# Patient Record
Sex: FEMALE | Race: ASIAN | Employment: OTHER | URBAN - METROPOLITAN AREA
[De-identification: names, ages, dates, MRNs, and addresses within clinical notes are randomized per-mention and may not be internally consistent; named-entity substitution may affect disease eponyms.]

---

## 2017-02-22 PROBLEM — Z98.890 HISTORY OF PARATHYROID SURGERY: Status: ACTIVE | Noted: 2017-02-22

## 2017-02-28 ENCOUNTER — HOSPITAL ENCOUNTER (OUTPATIENT)
Dept: PHYSICAL THERAPY | Age: 72
Discharge: HOME | End: 2017-02-28
Attending: INTERNAL MEDICINE
Payer: MEDICARE

## 2017-02-28 DIAGNOSIS — M75.01 ADHESIVE CAPSULITIS OF RIGHT SHOULDER: ICD-10-CM

## 2017-02-28 PROCEDURE — 97162 PT EVAL MOD COMPLEX 30 MIN: CPT

## 2017-02-28 PROCEDURE — G8985 CARRY GOAL STATUS: HCPCS

## 2017-02-28 PROCEDURE — G8984 CARRY CURRENT STATUS: HCPCS

## 2017-02-28 NOTE — PROGRESS NOTES
Nathalie Gibbons  : [facility] [address]  [phone]   [phone]        OUTPATIENT PHYSICAL THERAPY:Initial Assessment 2017    ICD-10: Treatment Diagnosis: Pain in Joint, R Shoulder- M25.511  Pain in Joint, L Shoulder- M25.512  Precautions/Allergies:   Review of patient's allergies indicates no known allergies. Fall Risk Score: 0 (? 5 = High Risk)  MD Orders: Eval and Treat MEDICAL/REFERRING DIAGNOSIS:  Adhesive capsulitis of right shoulder [M75.01]  DATE OF ONSET: Chronic for the past year  REFERRING PHYSICIAN: Toi Mejias MD  RETURN PHYSICIAN APPOINTMENT: 3/28/17     INITIAL ASSESSMENT:  Ms. Jena Almaguer presents with signs and symptoms consistent with R and L supraspinatus tendinopathy and muscle guarding/spasms with loss of ROM due to pain and muscle dysfunction. PROBLEM LIST (Impacting functional limitations):  1. Decreased Strength  2. Decreased ADL/Functional Activities  3. Increased Pain  4. Decreased Activity Tolerance  5. Decreased Flexibility/Joint Mobility INTERVENTIONS PLANNED:  1. Home Exercise Program (HEP)  2. Manual Therapy  3. Neuromuscular Re-education/Strengthening  4. Range of Motion (ROM)  5. Therapeutic Activites  6. Therapeutic Exercise/Strengthening   TREATMENT PLAN:  Effective Dates: 17 TO 17. Frequency/Duration: 2 times a week for 8 weeks  GOALS: (Goals have been discussed and agreed upon with patient.)  SHORT-TERM FUNCTIONAL GOALS: Time Frame: 3/14/2017  1. The patient will be independent with a basic home exercise program(HEP) to address pain, AROM, and muscle function. 2. The patient will show a decrease in pain level to <3/10. DISCHARGE GOALS: Time Frame: 2017  1. The patient will be independent with an advanced home exercise program(HEP) to address pain, AROM, and muscle function.   2. The patient will show improvement of AROM to within functional limits to improve ability to reach overhead, reach behind back, and perform self care activities. 3. The patient will show a return of bilateral shoulder girdle muscle strength to >4+/5 and improvements in muscle coordination, control, and conditioning for return to activities of reaching overhead, perform cooking, perform dressing and self care activities, and performing lifting/carrying activities. 4. The patient will show a decrease in pain level to <2/10 to improve ability to return to activities of reaching overhead, perform cooking, perform dressing and self care activities, and performing lifting/carrying activities. 5. The patient will improve QuickDASH score to <19/55 to reflect an improvement in this patients self reported functional ability. Rehabilitation Potential For Stated Goals: Good  Regarding Elmira Multani's therapy, I certify that the treatment plan above will be carried out by a therapist or under their direction. Thank you for this referral,  Jose Villegas , DPT, OCS, FAAOMPT, CSCS     Referring Physician Signature: Toi Mejias MD              Date                    The information in this section was collected on 2/28/17 (except where otherwise noted). HISTORY:   History of Present Injury/Illness (Reason for Referral): The patient comes to physical therapy clinic reporting bilateral shoulder pain with R worse than L. Pain started about a year ago with no known trauma or injury. The patient reports symptoms located subacromial and posterior in both shoulders. [place] of symptoms reported as dull most of the time but sharp with certain movements. Symptoms are reported as getting a little better over the last 3-4 weeks. Average pain level reported as 3/10 and worst pain a 5/10. Aggravating factors include reaching overhead, lifting, carrying, reaching behind back, and reaching up to fix hair.  Relieving factors include massage, heat, ice, tylenol, rest. Imaging performed includes none at this time. Past Medical History/Comorbidities:   Ms. Zachery Howe  has a past medical history of Bronchitis; Cyst in hand (11/26/2013); DJD (degenerative joint disease); History of rectal bleeding (11/26/2013); HTN (hypertension) (11/26/2013); Hyperlipidemia (11/26/2013); Rheumatoid arthritis(714.0) (11/26/2013); URI (upper respiratory infection); and Vitamin D deficiency (11/26/2013). Ms. Zachery Howe  has a past surgical history that includes knee replacement (Bilateral). 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 , Disp: , Rfl:     amLODIPine (NORVASC) 5 mg tablet, TAKE ONE TABLET BY MOUTH ONCE DAILY, Disp: 90 Tab, Rfl: 3    valsartan (DIOVAN) 160 mg tablet, Take 1 Tab by mouth daily. , Disp: 90 Tab, Rfl: 4    Cholecalciferol, Vitamin D3, 50,000 unit cap, Take 1 Cap by mouth every seven (7) days. , Disp: 12 Cap, Rfl: 12    metoprolol tartrate (LOPRESSOR) 100 mg IR tablet, Take 1 Tab by mouth two (2) times a day., Disp: 180 Tab, Rfl: 3    VARICELLA-ZOSTER VIRUS INFECTION PROPHYLAXIS 19,400 unit/0.65 mL susr injection, , Disp: , Rfl:     methotrexate (RHEUMATREX) 2.5 mg tablet, , Disp: , Rfl:     alendronate (FOSAMAX) 70 mg tablet, , Disp: , Rfl:     FOLIC ACID PO, Take  by mouth., Disp: , Rfl:     FLAXSEED OIL, by Does Not Apply route., Disp: , Rfl:     CHOLECALCIFEROL, VITAMIN D3, (VITAMIN D3 PO), Take  by mouth., Disp: , Rfl:    Date Last Reviewed:  2/28/2017    Number of Personal Factors/Comorbidities that affect the Plan of Care: 1-2: MODERATE COMPLEXITY   EXAMINATION:      Observation/Orthostatic:  In Standing    Head Position Forward   Thoracic Observation Slight kyphosis   Scapular Position Elevated R with tense R upper trap   Palpation: tender subacromial space, infraspinatus, and biceps tendon bilaterally    Passive Joint Mobility:  (unable due to muscle guarding from patient)  Joint Right Left   Glenohumeral Joint     Scapulothoracic Joint     Range of Motion:  ROM in degrees AROM Right AROM Left PROM Right PROM Left   Shoulder Flexion 130 pain 125 mild pain  160 mild pain 165 mild pain   Shoulder Abduction 125 pain 135 mild pain 140 mild pain 150 mild pain   Shoulder Extension WFL; mild pain [facility]   Shoulder Internal Rotation (IR)  (reaching up back) Up to L3 Up to L1     Shoulder Internal Rotation (IR)  (45 degrees of abduction)   45 50   Shoulder External Rotation (ER)  (measured in scapular plane)   80 85   ER (measured at 0 degrees of abduction) pain pain     Strength:    Right Left   Shoulder Flexion 4-/5 pain 4-/5 pain   Shoulder Extension 4/5 4/5   Shoulder Abduction 4-/5 pain 4-/5 pain   Shoulder Adduction 4/5 4/5   Shoulder ER 4-/5 pain 4-/5 pain   Shoulder IR 4/5 4/5         Body Structures Involved:  1. Bones  2. Joints  3. Muscles  4. Ligaments Body Functions Affected:  1. Neuromusculoskeletal  2. Movement Related Activities and Participation Affected:  1. General Tasks and Demands  2. Self Care  3. Domestic Life  4. Community, Social and [place]   Number of elements (examined above) that affect the Plan of Care: 4+: HIGH COMPLEXITY   CLINICAL PRESENTATION:   Presentation: Evolving clinical presentation with changing clinical characteristics: MODERATE COMPLEXITY   CLINICAL DECISION MAKING:   Outcome Measure: Tool Used: Disabilities of the Arm, Shoulder and Hand (DASH) Questionnaire - Quick Version  Score:  Initial: 29/55  Most Recent: X/55 (Date: -- )   Interpretation of Score: The DASH is designed to measure the activities of daily living in person's with upper extremity dysfunction or pain.   Each section is scored on a 1-5 scale, 5 representing the greatest disability. The scores of each section are added together for a total score of 55. Score 11 12-19 20-28 29-37 38-45 46-54 55   Modifier CH CI CJ CK CL CM CN     ? Carrying, Moving, and Handling Objects:     - CURRENT STATUS: CK - 40%-59% impaired, limited or restricted    - GOAL STATUS: CI - 1%-19% impaired, limited or restricted    - D/C STATUS:  ---------------To be determined---------------    Medical Necessity:   · Patient demonstrates good rehab potential due to higher previous functional level. Reason for Services/Other Comments:  · Patient continues to require skilled intervention due to continued functional limitations due to continued impairments. Use of outcome tool(s) and clinical judgement create a POC that gives a: Questionable prediction of patient's progress: MODERATE COMPLEXITY            TREATMENT:   (In addition to Assessment/Re-Assessment sessions the following treatments were rendered)  Pre-treatment Symptoms/Complaints:  Dull pain   Pain: Initial:   Pain Intensity 1: 3/10 Post Session:  3/10     Therapeutic Exercise: ( ):  Exercises per grid below to improve mobility, strength and coordination. Required minimal visual and verbal cues to promote proper body posture and promote proper body mechanics. Progressed exercises as indicated. Date:  2/28/17   Activity/Exercise Parameters   None Today                              Manual Therapy (     ): Manual techniques to facilitate improved motion and decreased pain. · None today    Treatment/Session Assessment:    · Response to Treatment:  None today. · Compliance with Program/Exercises: Will assess as treatment progresses. · Recommendations/Intent for next treatment session: \"Next visit will focus on advancements to more challenging activities\".   Total Treatment Duration:  PT Patient Time In/Time Out  Time In: 0930  Time Out: [id number] , DPT, OCS, FAAOMPT, CSCS

## 2017-03-01 NOTE — PROGRESS NOTES
Ambulatory/Rehab Services H2 Model Falls Risk Assessment    Risk Factor Pts. ·   Confusion/Disorientation/Impulsivity  []    4 ·   Symptomatic Depression  []   2 ·   Altered Elimination  []   1 ·   Dizziness/Vertigo  []   1 ·   Gender (Male)  []   1 ·   Any administered antiepileptics (anticonvulsants):  []   2 ·   Any administered benzodiazepines:  []   1 ·   Visual Impairment (specify):  []   1 ·   Portable Oxygen Use  []   1 ·   Orthostatic ? BP  []   1 ·   History of Recent Falls (within 3 mos.)  []   5     Ability to Rise from Chair (choose one) Pts. ·   Ability to rise in a single movement  [x]   0 ·   Pushes up, successful in one attempt  []   1 ·   Multiple attempts, but successful  []   3 ·   Unable to rise without assistance  []   4   Total: (5 or greater = High Risk) 0     Falls Prevention Plan:   []                Physical Limitations to Exercise (specify):   []                Mobility Assistance Device (type):   []                Exercise/Equipment Adaptation (specify):    ©2010 [facility] of [address] Patent #[id number].  Federal Law prohibits the replication, distribution or use without written permission from [facility] [employer]

## 2017-03-03 ENCOUNTER — HOSPITAL ENCOUNTER (OUTPATIENT)
Dept: PHYSICAL THERAPY | Age: 72
Discharge: HOME | End: 2017-03-03
Attending: INTERNAL MEDICINE
Payer: MEDICARE

## 2017-03-03 PROCEDURE — 97110 THERAPEUTIC EXERCISES: CPT

## 2017-03-03 NOTE — PROGRESS NOTES
Krissy Flores  : [facility] [address] [place] [address]  [phone]   [phone]        OUTPATIENT PHYSICAL THERAPY:Daily Note 3/3/2017    ICD-10: Treatment Diagnosis: Pain in Joint, R Shoulder- M25.511  Pain in Joint, L Shoulder- M25.512  Precautions/Allergies:   Review of patient's allergies indicates no known allergies. Fall Risk Score: 0 (? 5 = High Risk)  MD Orders: Eval and Treat MEDICAL/REFERRING DIAGNOSIS:  Persons encountering health services in other specified circumstances [Z76.89]  DATE OF ONSET: Chronic for the past year  REFERRING PHYSICIAN: Aramis Torres MD  RETURN PHYSICIAN APPOINTMENT: 3/28/17     INITIAL ASSESSMENT:  Ms. Cristin Gonzalez presents with signs and symptoms consistent with R and L supraspinatus tendinopathy and muscle guarding/spasms with loss of ROM due to pain and muscle dysfunction. PROBLEM LIST (Impacting functional limitations):  1. Decreased Strength  2. Decreased ADL/Functional Activities  3. Increased Pain  4. Decreased Activity Tolerance  5. Decreased Flexibility/Joint Mobility INTERVENTIONS PLANNED:  1. Home Exercise Program (HEP)  2. Manual Therapy  3. Neuromuscular Re-education/Strengthening  4. Range of Motion (ROM)  5. Therapeutic Activites  6. Therapeutic Exercise/Strengthening   TREATMENT PLAN:  Effective Dates: 17 TO 17. Frequency/Duration: 2 times a week for 8 weeks  GOALS: (Goals have been discussed and agreed upon with patient.)  SHORT-TERM FUNCTIONAL GOALS: Time Frame: 3/14/2017  1. The patient will be independent with a basic home exercise program(HEP) to address pain, AROM, and muscle function. 2. The patient will show a decrease in pain level to <3/10. DISCHARGE GOALS: Time Frame: 2017  1. The patient will be independent with an advanced home exercise program(HEP) to address pain, AROM, and muscle function.   2. The patient will show improvement of AROM to within functional limits to improve ability to reach overhead, reach behind back, and perform self care activities. 3. The patient will show a return of bilateral shoulder girdle muscle strength to >4+/5 and improvements in muscle coordination, control, and conditioning for return to activities of reaching overhead, perform cooking, perform dressing and self care activities, and performing lifting/carrying activities. 4. The patient will show a decrease in pain level to <2/10 to improve ability to return to activities of reaching overhead, perform cooking, perform dressing and self care activities, and performing lifting/carrying activities. 5. The patient will improve QuickDASH score to <19/55 to reflect an improvement in this patients self reported functional ability. Rehabilitation Potential For Stated Goals: Good  Regarding Elmira Multani's therapy, I certify that the treatment plan above will be carried out by a therapist or under their direction. Thank you for this referral,  Miky Reed , DPT, OCS, FAAOMPT, CSCS     Referring Physician Signature: Minnie Koyanagi, MD              Date                    The information in this section was collected on 2/28/17 (except where otherwise noted). HISTORY:   History of Present Injury/Illness (Reason for Referral): The patient comes to physical therapy clinic reporting bilateral shoulder pain with R worse than L. Pain started about a year ago with no known trauma or injury. The patient reports symptoms located subacromial and posterior in both shoulders. [place] of symptoms reported as dull most of the time but sharp with certain movements. Symptoms are reported as getting a little better over the last 3-4 weeks. Average pain level reported as 3/10 and worst pain a 5/10. Aggravating factors include reaching overhead, lifting, carrying, reaching behind back, and reaching up to fix hair.  Relieving factors include massage, heat, ice, tylenol, rest. Imaging performed includes none at this time. Past Medical History/Comorbidities:   Ms. Bibi Mallory  has a past medical history of Bronchitis; Cyst in hand (11/26/2013); DJD (degenerative joint disease); History of rectal bleeding (11/26/2013); HTN (hypertension) (11/26/2013); Hyperlipidemia (11/26/2013); Rheumatoid arthritis(714.0) (11/26/2013); URI (upper respiratory infection); and Vitamin D deficiency (11/26/2013). Ms. Bibi Mallory  has a past surgical history that includes knee replacement (Bilateral). 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 , Disp: , Rfl:     amLODIPine (NORVASC) 5 mg tablet, TAKE ONE TABLET BY MOUTH ONCE DAILY, Disp: 90 Tab, Rfl: 3    valsartan (DIOVAN) 160 mg tablet, Take 1 Tab by mouth daily. , Disp: 90 Tab, Rfl: 4    Cholecalciferol, Vitamin D3, 50,000 unit cap, Take 1 Cap by mouth every seven (7) days. , Disp: 12 Cap, Rfl: 12    metoprolol tartrate (LOPRESSOR) 100 mg IR tablet, Take 1 Tab by mouth two (2) times a day., Disp: 180 Tab, Rfl: 3    VARICELLA-ZOSTER VIRUS INFECTION PROPHYLAXIS 19,400 unit/0.65 mL susr injection, , Disp: , Rfl:     methotrexate (RHEUMATREX) 2.5 mg tablet, , Disp: , Rfl:     alendronate (FOSAMAX) 70 mg tablet, , Disp: , Rfl:     FOLIC ACID PO, Take  by mouth., Disp: , Rfl:     FLAXSEED OIL, by Does Not Apply route., Disp: , Rfl:     CHOLECALCIFEROL, VITAMIN D3, (VITAMIN D3 PO), Take  by mouth., Disp: , Rfl:    Date Last Reviewed:  3/3/2017    Number of Personal Factors/Comorbidities that affect the Plan of Care: 1-2: MODERATE COMPLEXITY   EXAMINATION:      Observation/Orthostatic:  In Standing    Head Position Forward   Thoracic Observation Slight kyphosis   Scapular Position Elevated R with tense R upper trap   Palpation: tender subacromial space, infraspinatus, and biceps tendon bilaterally    Passive Joint Mobility:  (unable due to muscle guarding from patient)  Joint Right Left   Glenohumeral Joint     Scapulothoracic Joint     Range of Motion:  ROM in degrees AROM Right AROM Left PROM Right PROM Left   Shoulder Flexion 130 pain 125 mild pain  160 mild pain 165 mild pain   Shoulder Abduction 125 pain 135 mild pain 140 mild pain 150 mild pain   Shoulder Extension WFL; mild pain [facility]   Shoulder Internal Rotation (IR)  (reaching up back) Up to L3 Up to L1     Shoulder Internal Rotation (IR)  (45 degrees of abduction)   45 50   Shoulder External Rotation (ER)  (measured in scapular plane)   80 85   ER (measured at 0 degrees of abduction) pain pain     Strength:    Right Left   Shoulder Flexion 4-/5 pain 4-/5 pain   Shoulder Extension 4/5 4/5   Shoulder Abduction 4-/5 pain 4-/5 pain   Shoulder Adduction 4/5 4/5   Shoulder ER 4-/5 pain 4-/5 pain   Shoulder IR 4/5 4/5         Body Structures Involved:  1. Bones  2. Joints  3. Muscles  4. Ligaments Body Functions Affected:  1. Neuromusculoskeletal  2. Movement Related Activities and Participation Affected:  1. General Tasks and Demands  2. Self Care  3. Domestic Life  4. Community, Social and [place]   Number of elements (examined above) that affect the Plan of Care: 4+: HIGH COMPLEXITY   CLINICAL PRESENTATION:   Presentation: Evolving clinical presentation with changing clinical characteristics: MODERATE COMPLEXITY   CLINICAL DECISION MAKING:   Outcome Measure: Tool Used: Disabilities of the Arm, Shoulder and Hand (DASH) Questionnaire - Quick Version  Score:  Initial: 29/55  Most Recent: X/55 (Date: -- )   Interpretation of Score: The DASH is designed to measure the activities of daily living in person's with upper extremity dysfunction or pain.   Each section is scored on a 1-5 scale, 5 representing the greatest disability. The scores of each section are added together for a total score of 55. Score 11 12-19 20-28 29-37 38-45 46-54 55   Modifier CH CI CJ CK CL CM CN     ? Carrying, Moving, and Handling Objects:     - CURRENT STATUS: CK - 40%-59% impaired, limited or restricted    - GOAL STATUS: CI - 1%-19% impaired, limited or restricted    - D/C STATUS:  ---------------To be determined---------------    Medical Necessity:   · Patient demonstrates good rehab potential due to higher previous functional level. Reason for Services/Other Comments:  · Patient continues to require skilled intervention due to continued functional limitations due to continued impairments. Use of outcome tool(s) and clinical judgement create a POC that gives a: Questionable prediction of patient's progress: MODERATE COMPLEXITY            TREATMENT:   (In addition to Assessment/Re-Assessment sessions the following treatments were rendered)  Pre-treatment Symptoms/Complaints:  (3/3/2017)  Pt reports no significant change in her pain since last visit. Pain: Initial:   Pain Intensity 1: 4/10 Post Session:  3/10     Therapeutic Exercise: (55 Minutes):  Exercises per grid below to improve mobility, strength and coordination. Required minimal visual and verbal cues to promote proper body posture and promote proper body mechanics. Progressed exercises as indicated. Date:  3/3/2017    Activity/Exercise Parameters   UE Bike 5 min   Supine AROM Flexion 3x30  1 lbs   Supine External Rotation (ER)/Internal Rotation (IR) 3x30  2 lbs   Standing Wall Slides into Flexion 2x15   Manual PROM performed by therapist                  Manual Therapy (     ): Manual techniques to facilitate improved motion and decreased pain.   · None today    Treatment/Session Assessment:  (3/3/2017)  Pt shows good tolerance for initial day of therex with no irritation of symptoms and improving muscle performance    · Response to Treatment:  None today.  · Compliance with Program/Exercises: Will assess as treatment progresses. · Recommendations/Intent for next treatment session: \"Next visit will focus on advancements to more challenging activities\".   Total Treatment Duration:  PT Patient Time In/Time Out  Time In: 1030  Time Out: [address] , DPT, OCS, FAAOMPT, CSCS

## 2017-03-14 ENCOUNTER — HOSPITAL ENCOUNTER (OUTPATIENT)
Dept: PHYSICAL THERAPY | Age: 72
Discharge: HOME | End: 2017-03-14
Attending: INTERNAL MEDICINE
Payer: MEDICARE

## 2017-03-14 PROCEDURE — 97110 THERAPEUTIC EXERCISES: CPT

## 2017-03-14 NOTE — PROGRESS NOTES
[place]us Law  : [facility] [address]  [phone]   [phone]        OUTPATIENT PHYSICAL THERAPY:Daily Note 3/14/2017    ICD-10: Treatment Diagnosis: Pain in Joint, R Shoulder- M25.511  Pain in Joint, L Shoulder- M25.512  Precautions/Allergies:   Review of patient's allergies indicates no known allergies. Fall Risk Score: 0 (? 5 = High Risk)  MD Orders: Eval and Treat MEDICAL/REFERRING DIAGNOSIS:  Persons encountering health services in other specified circumstances [Z76.89]  DATE OF ONSET: Chronic for the past year  REFERRING PHYSICIAN: Minh Mendez MD  RETURN PHYSICIAN APPOINTMENT: 3/28/17     INITIAL ASSESSMENT:  Ms. Haylie Carranza presents with signs and symptoms consistent with R and L supraspinatus tendinopathy and muscle guarding/spasms with loss of ROM due to pain and muscle dysfunction. PROBLEM LIST (Impacting functional limitations):  1. Decreased Strength  2. Decreased ADL/Functional Activities  3. Increased Pain  4. Decreased Activity Tolerance  5. Decreased Flexibility/Joint Mobility INTERVENTIONS PLANNED:  1. Home Exercise Program (HEP)  2. Manual Therapy  3. Neuromuscular Re-education/Strengthening  4. Range of Motion (ROM)  5. Therapeutic Activites  6. Therapeutic Exercise/Strengthening   TREATMENT PLAN:  Effective Dates: 17 TO 17. Frequency/Duration: 2 times a week for 8 weeks  GOALS: (Goals have been discussed and agreed upon with patient.)  SHORT-TERM FUNCTIONAL GOALS: Time Frame: 3/14/2017  1. The patient will be independent with a basic home exercise program(HEP) to address pain, AROM, and muscle function. 2. The patient will show a decrease in pain level to <3/10. DISCHARGE GOALS: Time Frame: 2017  1. The patient will be independent with an advanced home exercise program(HEP) to address pain, AROM, and muscle function.   2. The patient will show improvement of AROM to within functional limits to improve ability to reach overhead, reach behind back, and perform self care activities. 3. The patient will show a return of bilateral shoulder girdle muscle strength to >4+/5 and improvements in muscle coordination, control, and conditioning for return to activities of reaching overhead, perform cooking, perform dressing and self care activities, and performing lifting/carrying activities. 4. The patient will show a decrease in pain level to <2/10 to improve ability to return to activities of reaching overhead, perform cooking, perform dressing and self care activities, and performing lifting/carrying activities. 5. The patient will improve QuickDASH score to <19/55 to reflect an improvement in this patients self reported functional ability. Rehabilitation Potential For Stated Goals: Good  Regarding Elmira Multani's therapy, I certify that the treatment plan above will be carried out by a therapist or under their direction. Thank you for this referral,  Sommer Morgan , DPT, OCS, FAAOMPT, CSCS     Referring Physician Signature: Lissette Good MD              Date                    The information in this section was collected on 2/28/17 (except where otherwise noted). HISTORY:   History of Present Injury/Illness (Reason for Referral): The patient comes to physical therapy clinic reporting bilateral shoulder pain with R worse than L. Pain started about a year ago with no known trauma or injury. The patient reports symptoms located subacromial and posterior in both shoulders. Tedra Clutter of symptoms reported as dull most of the time but sharp with certain movements. Symptoms are reported as getting a little better over the last 3-4 weeks. Average pain level reported as 3/10 and worst pain a 5/10. Aggravating factors include reaching overhead, lifting, carrying, reaching behind back, and reaching up to fix hair.  Relieving factors include massage, heat, ice, tylenol, rest. Imaging performed includes none at this time. Past Medical History/Comorbidities:   Ms. Gerry Azar  has a past medical history of Bronchitis; Cyst in hand (11/26/2013); DJD (degenerative joint disease); History of rectal bleeding (11/26/2013); HTN (hypertension) (11/26/2013); Hyperlipidemia (11/26/2013); Rheumatoid arthritis(714.0) (11/26/2013); URI (upper respiratory infection); and Vitamin D deficiency (11/26/2013). Ms. Gerry Azar  has a past surgical history that includes knee replacement (Bilateral). 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 , Disp: , Rfl:     amLODIPine (NORVASC) 5 mg tablet, TAKE ONE TABLET BY MOUTH ONCE DAILY, Disp: 90 Tab, Rfl: 3    valsartan (DIOVAN) 160 mg tablet, Take 1 Tab by mouth daily. , Disp: 90 Tab, Rfl: 4    Cholecalciferol, Vitamin D3, 50,000 unit cap, Take 1 Cap by mouth every seven (7) days. , Disp: 12 Cap, Rfl: 12    metoprolol tartrate (LOPRESSOR) 100 mg IR tablet, Take 1 Tab by mouth two (2) times a day., Disp: 180 Tab, Rfl: 3    VARICELLA-ZOSTER VIRUS INFECTION PROPHYLAXIS 19,400 unit/0.65 mL susr injection, , Disp: , Rfl:     methotrexate (RHEUMATREX) 2.5 mg tablet, , Disp: , Rfl:     alendronate (FOSAMAX) 70 mg tablet, , Disp: , Rfl:     FOLIC ACID PO, Take  by mouth., Disp: , Rfl:     FLAXSEED OIL, by Does Not Apply route., Disp: , Rfl:     CHOLECALCIFEROL, VITAMIN D3, (VITAMIN D3 PO), Take  by mouth., Disp: , Rfl:    Date Last Reviewed:  3/14/2017    Number of Personal Factors/Comorbidities that affect the Plan of Care: 1-2: MODERATE COMPLEXITY   EXAMINATION:      Observation/Orthostatic:  In Standing    Head Position Forward   Thoracic Observation Slight kyphosis   Scapular Position Elevated R with tense R upper trap   Palpation: tender subacromial space, infraspinatus, and biceps tendon bilaterally    Passive Joint Mobility:  (unable due to muscle guarding from patient)  Joint Right Left   Glenohumeral Joint     Scapulothoracic Joint     Range of Motion:  ROM in degrees AROM Right AROM Left PROM Right PROM Left   Shoulder Flexion 130 pain 125 mild pain  160 mild pain 165 mild pain   Shoulder Abduction 125 pain 135 mild pain 140 mild pain 150 mild pain   Shoulder Extension WFL; mild pain [facility]   Shoulder Internal Rotation (IR)  (reaching up back) Up to L3 Up to L1     Shoulder Internal Rotation (IR)  (45 degrees of abduction)   45 50   Shoulder External Rotation (ER)  (measured in scapular plane)   80 85   ER (measured at 0 degrees of abduction) pain pain     Strength:    Right Left   Shoulder Flexion 4-/5 pain 4-/5 pain   Shoulder Extension 4/5 4/5   Shoulder Abduction 4-/5 pain 4-/5 pain   Shoulder Adduction 4/5 4/5   Shoulder ER 4-/5 pain 4-/5 pain   Shoulder IR 4/5 4/5         Body Structures Involved:  1. Bones  2. Joints  3. Muscles  4. Ligaments Body Functions Affected:  1. Neuromusculoskeletal  2. Movement Related Activities and Participation Affected:  1. General Tasks and Demands  2. Self Care  3. Domestic Life  4. Community, Social and [place]   Number of elements (examined above) that affect the Plan of Care: 4+: HIGH COMPLEXITY   CLINICAL PRESENTATION:   Presentation: Evolving clinical presentation with changing clinical characteristics: MODERATE COMPLEXITY   CLINICAL DECISION MAKING:   Outcome Measure: Tool Used: Disabilities of the Arm, Shoulder and Hand (DASH) Questionnaire - Quick Version  Score:  Initial: 29/55  Most Recent: X/55 (Date: -- )   Interpretation of Score: The DASH is designed to measure the activities of daily living in person's with upper extremity dysfunction or pain.   Each section is scored on a 1-5 scale, 5 representing the greatest disability. The scores of each section are added together for a total score of 55. Score 11 12-19 20-28 29-37 38-45 46-54 55   Modifier CH CI CJ CK CL CM CN     ? Carrying, Moving, and Handling Objects:     - CURRENT STATUS: CK - 40%-59% impaired, limited or restricted    - GOAL STATUS: CI - 1%-19% impaired, limited or restricted    - D/C STATUS:  ---------------To be determined---------------    Medical Necessity:   · Patient demonstrates good rehab potential due to higher previous functional level. Reason for Services/Other Comments:  · Patient continues to require skilled intervention due to continued functional limitations due to continued impairments. Use of outcome tool(s) and clinical judgement create a POC that gives a: Questionable prediction of patient's progress: MODERATE COMPLEXITY            TREATMENT:   (In addition to Assessment/Re-Assessment sessions the following treatments were rendered)  Pre-treatment Symptoms/Complaints:  (3/14/2017)  Pt reports no irritation of symptoms from the exercises the other day    Pain: Initial:   Pain Intensity 1: 3/10 Post Session:  3/10     Therapeutic Exercise: (60 Minutes):  Exercises per grid below to improve mobility, strength and coordination. Required minimal visual and verbal cues to promote proper body posture and promote proper body mechanics. Progressed exercises as indicated. Date:  3/14/2017    Activity/Exercise Parameters   UE Bike 8 min   Supine AROM Flexion 3x30  2 lbs   Supine External Rotation (ER)/Internal Rotation (IR) 3x30  3 lbs   Standing Wall Slides into Flexion 2x15   Sidelying Arm Bar 4/30 sec  5 lbs   Manual PROM performed by therapist                  Manual Therapy (     ): Manual techniques to facilitate improved motion and decreased pain.   · None today    Treatment/Session Assessment:  (3/14/2017)  Pt shows good progression of painfree PROM today and increased tolerance for exercises with increased tolerance for loading today    · Response to Treatment:  No irritation reported   · Compliance with Program/Exercises: Will assess as treatment progresses. · Recommendations/Intent for next treatment session: \"Next visit will focus on advancements to more challenging activities\".   Total Treatment Duration:  PT Patient Time In/Time Out  Time In: 1030  Time Out: [facility] , DPT, OCS, FAAOMPT, CSCS

## 2017-03-17 ENCOUNTER — HOSPITAL ENCOUNTER (OUTPATIENT)
Dept: PHYSICAL THERAPY | Age: 72
Discharge: HOME | End: 2017-03-17
Attending: INTERNAL MEDICINE
Payer: MEDICARE

## 2017-03-17 PROCEDURE — 97110 THERAPEUTIC EXERCISES: CPT

## 2017-03-17 NOTE — PROGRESS NOTES
Radha Johnson  : [facility] [address] [place] [address]  [phone]   [phone]        OUTPATIENT PHYSICAL THERAPY:Daily Note 3/17/2017    ICD-10: Treatment Diagnosis: Pain in Joint, R Shoulder- M25.511  Pain in Joint, L Shoulder- M25.512  Precautions/Allergies:   Review of patient's allergies indicates no known allergies. Fall Risk Score: 0 (? 5 = High Risk)  MD Orders: Eval and Treat MEDICAL/REFERRING DIAGNOSIS:  Persons encountering health services in other specified circumstances [Z76.89]  DATE OF ONSET: Chronic for the past year  REFERRING PHYSICIAN: Doug Del Rio MD  RETURN PHYSICIAN APPOINTMENT: 3/28/17     INITIAL ASSESSMENT:  Ms. Damián Berumen presents with signs and symptoms consistent with R and L supraspinatus tendinopathy and muscle guarding/spasms with loss of ROM due to pain and muscle dysfunction. PROBLEM LIST (Impacting functional limitations):  1. Decreased Strength  2. Decreased ADL/Functional Activities  3. Increased Pain  4. Decreased Activity Tolerance  5. Decreased Flexibility/Joint Mobility INTERVENTIONS PLANNED:  1. Home Exercise Program (HEP)  2. Manual Therapy  3. Neuromuscular Re-education/Strengthening  4. Range of Motion (ROM)  5. Therapeutic Activites  6. Therapeutic Exercise/Strengthening   TREATMENT PLAN:  Effective Dates: 17 TO 17. Frequency/Duration: 2 times a week for 8 weeks  GOALS: (Goals have been discussed and agreed upon with patient.)  SHORT-TERM FUNCTIONAL GOALS: Time Frame: 3/14/2017  1. The patient will be independent with a basic home exercise program(HEP) to address pain, AROM, and muscle function. 2. The patient will show a decrease in pain level to <3/10. DISCHARGE GOALS: Time Frame: 2017  1. The patient will be independent with an advanced home exercise program(HEP) to address pain, AROM, and muscle function.   2. The patient will show improvement of AROM to within functional limits to improve ability to reach overhead, reach behind back, and perform self care activities. 3. The patient will show a return of bilateral shoulder girdle muscle strength to >4+/5 and improvements in muscle coordination, control, and conditioning for return to activities of reaching overhead, perform cooking, perform dressing and self care activities, and performing lifting/carrying activities. 4. The patient will show a decrease in pain level to <2/10 to improve ability to return to activities of reaching overhead, perform cooking, perform dressing and self care activities, and performing lifting/carrying activities. 5. The patient will improve QuickDASH score to <19/55 to reflect an improvement in this patients self reported functional ability. Rehabilitation Potential For Stated Goals: Good  Regarding Elmira Multani's therapy, I certify that the treatment plan above will be carried out by a therapist or under their direction. Thank you for this referral,  Dalton Berg , DPT, OCS, FAAOMPT, CSCS     Referring Physician Signature: Margie Salazar MD              Date                    The information in this section was collected on 2/28/17 (except where otherwise noted). HISTORY:   History of Present Injury/Illness (Reason for Referral): The patient comes to physical therapy clinic reporting bilateral shoulder pain with R worse than L. Pain started about a year ago with no known trauma or injury. The patient reports symptoms located subacromial and posterior in both shoulders. Timmothy Baar of symptoms reported as dull most of the time but sharp with certain movements. Symptoms are reported as getting a little better over the last 3-4 weeks. Average pain level reported as 3/10 and worst pain a 5/10. Aggravating factors include reaching overhead, lifting, carrying, reaching behind back, and reaching up to fix hair.  Relieving factors include massage, heat, ice, tylenol, rest. Imaging performed includes none at this time. Past Medical History/Comorbidities:   Ms. Virgilio Echols  has a past medical history of Bronchitis; Cyst in hand (11/26/2013); DJD (degenerative joint disease); History of rectal bleeding (11/26/2013); HTN (hypertension) (11/26/2013); Hyperlipidemia (11/26/2013); Rheumatoid arthritis(714.0) (11/26/2013); URI (upper respiratory infection); and Vitamin D deficiency (11/26/2013). Ms. Virgilio Echols  has a past surgical history that includes knee replacement (Bilateral). 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 , Disp: , Rfl:     amLODIPine (NORVASC) 5 mg tablet, TAKE ONE TABLET BY MOUTH ONCE DAILY, Disp: 90 Tab, Rfl: 3    valsartan (DIOVAN) 160 mg tablet, Take 1 Tab by mouth daily. , Disp: 90 Tab, Rfl: 4    Cholecalciferol, Vitamin D3, 50,000 unit cap, Take 1 Cap by mouth every seven (7) days. , Disp: 12 Cap, Rfl: 12    metoprolol tartrate (LOPRESSOR) 100 mg IR tablet, Take 1 Tab by mouth two (2) times a day., Disp: 180 Tab, Rfl: 3    VARICELLA-ZOSTER VIRUS INFECTION PROPHYLAXIS 19,400 unit/0.65 mL susr injection, , Disp: , Rfl:     methotrexate (RHEUMATREX) 2.5 mg tablet, , Disp: , Rfl:     alendronate (FOSAMAX) 70 mg tablet, , Disp: , Rfl:     FOLIC ACID PO, Take  by mouth., Disp: , Rfl:     FLAXSEED OIL, by Does Not Apply route., Disp: , Rfl:     CHOLECALCIFEROL, VITAMIN D3, (VITAMIN D3 PO), Take  by mouth., Disp: , Rfl:    Date Last Reviewed:  3/17/2017    Number of Personal Factors/Comorbidities that affect the Plan of Care: 1-2: MODERATE COMPLEXITY   EXAMINATION:      Observation/Orthostatic:  In Standing    Head Position Forward   Thoracic Observation Slight kyphosis   Scapular Position Elevated R with tense R upper trap   Palpation: tender subacromial space, infraspinatus, and biceps tendon bilaterally    Passive Joint Mobility:  (unable due to muscle guarding from patient)  Joint Right Left   Glenohumeral Joint     Scapulothoracic Joint     Range of Motion:  ROM in degrees AROM Right AROM Left PROM Right PROM Left   Shoulder Flexion 130 pain 125 mild pain  160 mild pain 165 mild pain   Shoulder Abduction 125 pain 135 mild pain 140 mild pain 150 mild pain   Shoulder Extension WFL; mild pain [facility]   Shoulder Internal Rotation (IR)  (reaching up back) Up to L3 Up to L1     Shoulder Internal Rotation (IR)  (45 degrees of abduction)   45 50   Shoulder External Rotation (ER)  (measured in scapular plane)   80 85   ER (measured at 0 degrees of abduction) pain pain     Strength:    Right Left   Shoulder Flexion 4-/5 pain 4-/5 pain   Shoulder Extension 4/5 4/5   Shoulder Abduction 4-/5 pain 4-/5 pain   Shoulder Adduction 4/5 4/5   Shoulder ER 4-/5 pain 4-/5 pain   Shoulder IR 4/5 4/5         Body Structures Involved:  1. Bones  2. Joints  3. Muscles  4. Ligaments Body Functions Affected:  1. Neuromusculoskeletal  2. Movement Related Activities and Participation Affected:  1. General Tasks and Demands  2. Self Care  3. Domestic Life  4. Community, Social and [place]   Number of elements (examined above) that affect the Plan of Care: 4+: HIGH COMPLEXITY   CLINICAL PRESENTATION:   Presentation: Evolving clinical presentation with changing clinical characteristics: MODERATE COMPLEXITY   CLINICAL DECISION MAKING:   Outcome Measure: Tool Used: Disabilities of the Arm, Shoulder and Hand (DASH) Questionnaire - Quick Version  Score:  Initial: 29/55  Most Recent: X/55 (Date: -- )   Interpretation of Score: The DASH is designed to measure the activities of daily living in person's with upper extremity dysfunction or pain.   Each section is scored on a 1-5 scale, 5 representing the greatest disability. The scores of each section are added together for a total score of 55. Score 11 12-19 20-28 29-37 38-45 46-54 55   Modifier CH CI CJ CK CL CM CN     ? Carrying, Moving, and Handling Objects:     - CURRENT STATUS: CK - 40%-59% impaired, limited or restricted    - GOAL STATUS: CI - 1%-19% impaired, limited or restricted    - D/C STATUS:  ---------------To be determined---------------    Medical Necessity:   · Patient demonstrates good rehab potential due to higher previous functional level. Reason for Services/Other Comments:  · Patient continues to require skilled intervention due to continued functional limitations due to continued impairments. Use of outcome tool(s) and clinical judgement create a POC that gives a: Questionable prediction of patient's progress: MODERATE COMPLEXITY            TREATMENT:   (In addition to Assessment/Re-Assessment sessions the following treatments were rendered)    Pre-treatment Symptoms/Complaints:  (3/17/2017)  Pt reports she feels like her shoulder pain is improving with less pain during home and self care activities. Pain: Initial:   Pain Intensity 1: 4/10 Post Session:  3/10     Therapeutic Exercise: (60 Minutes):  Exercises per grid below to improve mobility, strength and coordination. Required minimal visual and verbal cues to promote proper body posture and promote proper body mechanics. Progressed exercises as indicated. Date:  3/17/2017    Activity/Exercise Parameters   UE Bike 8 min   Supine AROM Flexion 3x30  2 lbs   Supine External Rotation (ER)/Internal Rotation (IR) 3x30  3 lbs   Standing Wall Slides into Flexion 2x15   Sidelying Arm Bar 4/30 sec  5 lbs   Sidelying ER 2x10  1 lb   Manual PROM performed by therapist                  Manual Therapy (     ): Manual techniques to facilitate improved motion and decreased pain.   · None today    Treatment/Session Assessment:  (3/17/2017)  Pt shows good progress with muscle function and improvement with painfree PROM. · Response to Treatment:  No irritation reported   · Compliance with Program/Exercises: Will assess as treatment progresses. · Recommendations/Intent for next treatment session: \"Next visit will focus on advancements to more challenging activities\".   Total Treatment Duration:  PT Patient Time In/Time Out  Time In: 0930  Time Out: Reg [address] , DPT, OCS, FAAOMPT, CSCS

## 2017-03-22 ENCOUNTER — HOSPITAL ENCOUNTER (OUTPATIENT)
Dept: PHYSICAL THERAPY | Age: 72
Discharge: HOME | End: 2017-03-22
Attending: INTERNAL MEDICINE
Payer: MEDICARE

## 2017-03-22 PROCEDURE — 97110 THERAPEUTIC EXERCISES: CPT

## 2017-03-22 NOTE — PROGRESS NOTES
Mike Sin  : [facility] [address]  [phone]   [phone]        OUTPATIENT PHYSICAL THERAPY:Daily Note 3/22/2017    ICD-10: Treatment Diagnosis: Pain in Joint, R Shoulder- M25.511  Pain in Joint, L Shoulder- M25.512  Precautions/Allergies:   Review of patient's allergies indicates no known allergies. Fall Risk Score: 0 (? 5 = High Risk)  MD Orders: Eval and Treat MEDICAL/REFERRING DIAGNOSIS:  Persons encountering health services in other specified circumstances [Z76.89]  DATE OF ONSET: Chronic for the past year  REFERRING PHYSICIAN: Minnie Koyanagi, MD  RETURN PHYSICIAN APPOINTMENT: 3/28/17     INITIAL ASSESSMENT:  Ms. Chaya Craig presents with signs and symptoms consistent with R and L supraspinatus tendinopathy and muscle guarding/spasms with loss of ROM due to pain and muscle dysfunction. PROBLEM LIST (Impacting functional limitations):  1. Decreased Strength  2. Decreased ADL/Functional Activities  3. Increased Pain  4. Decreased Activity Tolerance  5. Decreased Flexibility/Joint Mobility INTERVENTIONS PLANNED:  1. Home Exercise Program (HEP)  2. Manual Therapy  3. Neuromuscular Re-education/Strengthening  4. Range of Motion (ROM)  5. Therapeutic Activites  6. Therapeutic Exercise/Strengthening   TREATMENT PLAN:  Effective Dates: 17 TO 17. Frequency/Duration: 2 times a week for 8 weeks  GOALS: (Goals have been discussed and agreed upon with patient.)  SHORT-TERM FUNCTIONAL GOALS: Time Frame: 3/14/2017  1. The patient will be independent with a basic home exercise program(HEP) to address pain, AROM, and muscle function. 2. The patient will show a decrease in pain level to <3/10. DISCHARGE GOALS: Time Frame: 2017  1. The patient will be independent with an advanced home exercise program(HEP) to address pain, AROM, and muscle function.   2. The patient will show improvement of AROM to within functional limits to improve ability to reach overhead, reach behind back, and perform self care activities. 3. The patient will show a return of bilateral shoulder girdle muscle strength to >4+/5 and improvements in muscle coordination, control, and conditioning for return to activities of reaching overhead, perform cooking, perform dressing and self care activities, and performing lifting/carrying activities. 4. The patient will show a decrease in pain level to <2/10 to improve ability to return to activities of reaching overhead, perform cooking, perform dressing and self care activities, and performing lifting/carrying activities. 5. The patient will improve QuickDASH score to <19/55 to reflect an improvement in this patients self reported functional ability. Rehabilitation Potential For Stated Goals: Good  Regarding Elmira Multani's therapy, I certify that the treatment plan above will be carried out by a therapist or under their direction. Thank you for this referral,  Angel Porter , DPT, OCS, FAAOMPT, CSCS     Referring Physician Signature: Rosas Salazar MD              Date                    The information in this section was collected on 2/28/17 (except where otherwise noted). HISTORY:   History of Present Injury/Illness (Reason for Referral): The patient comes to physical therapy clinic reporting bilateral shoulder pain with R worse than L. Pain started about a year ago with no known trauma or injury. The patient reports symptoms located subacromial and posterior in both shoulders. Gaurang Orn of symptoms reported as dull most of the time but sharp with certain movements. Symptoms are reported as getting a little better over the last 3-4 weeks. Average pain level reported as 3/10 and worst pain a 5/10. Aggravating factors include reaching overhead, lifting, carrying, reaching behind back, and reaching up to fix hair.  Relieving factors include massage, heat, ice, tylenol, rest. Imaging performed includes none at this time. Past Medical History/Comorbidities:   Ms. Zachery Howe  has a past medical history of Bronchitis; Cyst in hand (11/26/2013); DJD (degenerative joint disease); History of rectal bleeding (11/26/2013); HTN (hypertension) (11/26/2013); Hyperlipidemia (11/26/2013); Rheumatoid arthritis(714.0) (11/26/2013); URI (upper respiratory infection); and Vitamin D deficiency (11/26/2013). Ms. Zachery Howe  has a past surgical history that includes knee replacement (Bilateral). 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 , Disp: , Rfl:     amLODIPine (NORVASC) 5 mg tablet, TAKE ONE TABLET BY MOUTH ONCE DAILY, Disp: 90 Tab, Rfl: 3    valsartan (DIOVAN) 160 mg tablet, Take 1 Tab by mouth daily. , Disp: 90 Tab, Rfl: 4    Cholecalciferol, Vitamin D3, 50,000 unit cap, Take 1 Cap by mouth every seven (7) days. , Disp: 12 Cap, Rfl: 12    metoprolol tartrate (LOPRESSOR) 100 mg IR tablet, Take 1 Tab by mouth two (2) times a day., Disp: 180 Tab, Rfl: 3    VARICELLA-ZOSTER VIRUS INFECTION PROPHYLAXIS 19,400 unit/0.65 mL susr injection, , Disp: , Rfl:     methotrexate (RHEUMATREX) 2.5 mg tablet, , Disp: , Rfl:     alendronate (FOSAMAX) 70 mg tablet, , Disp: , Rfl:     FOLIC ACID PO, Take  by mouth., Disp: , Rfl:     FLAXSEED OIL, by Does Not Apply route., Disp: , Rfl:     CHOLECALCIFEROL, VITAMIN D3, (VITAMIN D3 PO), Take  by mouth., Disp: , Rfl:    Date Last Reviewed:  3/22/2017    Number of Personal Factors/Comorbidities that affect the Plan of Care: 1-2: MODERATE COMPLEXITY   EXAMINATION:      Observation/Orthostatic:  In Standing    Head Position Forward   Thoracic Observation Slight kyphosis   Scapular Position Elevated R with tense R upper trap   Palpation: tender subacromial space, infraspinatus, and biceps tendon bilaterally    Passive Joint Mobility:  (unable due to muscle guarding from patient)  Joint Right Left   Glenohumeral Joint     Scapulothoracic Joint     Range of Motion:  ROM in degrees AROM Right AROM Left PROM Right PROM Left   Shoulder Flexion 130 pain 125 mild pain  160 mild pain 165 mild pain   Shoulder Abduction 125 pain 135 mild pain 140 mild pain 150 mild pain   Shoulder Extension WFL; mild pain [facility]   Shoulder Internal Rotation (IR)  (reaching up back) Up to L3 Up to L1     Shoulder Internal Rotation (IR)  (45 degrees of abduction)   45 50   Shoulder External Rotation (ER)  (measured in scapular plane)   80 85   ER (measured at 0 degrees of abduction) pain pain     Strength:    Right Left   Shoulder Flexion 4-/5 pain 4-/5 pain   Shoulder Extension 4/5 4/5   Shoulder Abduction 4-/5 pain 4-/5 pain   Shoulder Adduction 4/5 4/5   Shoulder ER 4-/5 pain 4-/5 pain   Shoulder IR 4/5 4/5         Body Structures Involved:  1. Bones  2. Joints  3. Muscles  4. Ligaments Body Functions Affected:  1. Neuromusculoskeletal  2. Movement Related Activities and Participation Affected:  1. General Tasks and Demands  2. Self Care  3. Domestic Life  4. Community, Social and [place]   Number of elements (examined above) that affect the Plan of Care: 4+: HIGH COMPLEXITY   CLINICAL PRESENTATION:   Presentation: Evolving clinical presentation with changing clinical characteristics: MODERATE COMPLEXITY   CLINICAL DECISION MAKING:   Outcome Measure: Tool Used: Disabilities of the Arm, Shoulder and Hand (DASH) Questionnaire - Quick Version  Score:  Initial: 29/55  Most Recent: X/55 (Date: -- )   Interpretation of Score: The DASH is designed to measure the activities of daily living in person's with upper extremity dysfunction or pain.   Each section is scored on a 1-5 scale, 5 representing the greatest disability. The scores of each section are added together for a total score of 55. Score 11 12-19 20-28 29-37 38-45 46-54 55   Modifier CH CI CJ CK CL CM CN     ? Carrying, Moving, and Handling Objects:     - CURRENT STATUS: CK - 40%-59% impaired, limited or restricted    - GOAL STATUS: CI - 1%-19% impaired, limited or restricted    - D/C STATUS:  ---------------To be determined---------------    Medical Necessity:   · Patient demonstrates good rehab potential due to higher previous functional level. Reason for Services/Other Comments:  · Patient continues to require skilled intervention due to continued functional limitations due to continued impairments. Use of outcome tool(s) and clinical judgement create a POC that gives a: Questionable prediction of patient's progress: MODERATE COMPLEXITY            TREATMENT:   (In addition to Assessment/Re-Assessment sessions the following treatments were rendered)    Pre-treatment Symptoms/Complaints:  (3/22/2017)  Pt reports she feels continued improvement with shoulder pain and functional ability but still painful with reaching activities. Pain: Initial:   Pain Intensity 1: 4/10 Post Session:  3/10     Therapeutic Exercise: (60 Minutes):  Exercises per grid below to improve mobility, strength and coordination. Required minimal visual and verbal cues to promote proper body posture and promote proper body mechanics. Progressed exercises as indicated. Date:  3/22/2017    Activity/Exercise Parameters   UE Bike 8 min   Supine AROM Flexion 1x30  2 lbs   Supine External Rotation (ER)/Internal Rotation (IR) 1x30  3 lbs   Shoulder Press 2x15  1 lbs   Sidelying Arm Bar 4/30 sec  6 lbs   Sidelying ER 2x10  1 lb   Manual PROM performed by therapist                  Manual Therapy (     ): Manual techniques to facilitate improved motion and decreased pain.   · None today    Treatment/Session Assessment:  (3/22/2017)  Pt shows good improvement in load tolerance of shoulder musculature with exercises today. She denies any increased irritation with progression of exercises    · Response to Treatment:  No irritation reported   · Compliance with Program/Exercises: Will assess as treatment progresses. · Recommendations/Intent for next treatment session: \"Next visit will focus on advancements to more challenging activities\".   Total Treatment Duration:  PT Patient Time In/Time Out  Time In: 1030  Time Out: [address] , DPT, OCS, FAAOMPT, CSCS

## 2017-03-24 ENCOUNTER — HOSPITAL ENCOUNTER (OUTPATIENT)
Dept: PHYSICAL THERAPY | Age: 72
Discharge: HOME | End: 2017-03-24
Attending: INTERNAL MEDICINE
Payer: MEDICARE

## 2017-03-24 PROCEDURE — 97110 THERAPEUTIC EXERCISES: CPT

## 2017-03-24 NOTE — PROGRESS NOTES
Juventino Montalvo  : [facility] [address] [place] [address]  [phone]   [phone]        OUTPATIENT PHYSICAL THERAPY:Daily Note 3/24/2017    ICD-10: Treatment Diagnosis: Pain in Joint, R Shoulder- M25.511  Pain in Joint, L Shoulder- M25.512  Precautions/Allergies:   Review of patient's allergies indicates no known allergies. Fall Risk Score: 0 (? 5 = High Risk)  MD Orders: Eval and Treat MEDICAL/REFERRING DIAGNOSIS:  Persons encountering health services in other specified circumstances [Z76.89]  DATE OF ONSET: Chronic for the past year  REFERRING PHYSICIAN: Lerry Gaucher, MD  RETURN PHYSICIAN APPOINTMENT: 3/28/17     INITIAL ASSESSMENT:  Ms. Popeye Dyer presents with signs and symptoms consistent with R and L supraspinatus tendinopathy and muscle guarding/spasms with loss of ROM due to pain and muscle dysfunction. PROBLEM LIST (Impacting functional limitations):  1. Decreased Strength  2. Decreased ADL/Functional Activities  3. Increased Pain  4. Decreased Activity Tolerance  5. Decreased Flexibility/Joint Mobility INTERVENTIONS PLANNED:  1. Home Exercise Program (HEP)  2. Manual Therapy  3. Neuromuscular Re-education/Strengthening  4. Range of Motion (ROM)  5. Therapeutic Activites  6. Therapeutic Exercise/Strengthening   TREATMENT PLAN:  Effective Dates: 17 TO 17. Frequency/Duration: 2 times a week for 8 weeks  GOALS: (Goals have been discussed and agreed upon with patient.)  SHORT-TERM FUNCTIONAL GOALS: Time Frame: 3/14/2017  1. The patient will be independent with a basic home exercise program(HEP) to address pain, AROM, and muscle function. 2. The patient will show a decrease in pain level to <3/10. DISCHARGE GOALS: Time Frame: 2017  1. The patient will be independent with an advanced home exercise program(HEP) to address pain, AROM, and muscle function.   2. The patient will show improvement of AROM to within functional limits to improve ability to reach overhead, reach behind back, and perform self care activities. 3. The patient will show a return of bilateral shoulder girdle muscle strength to >4+/5 and improvements in muscle coordination, control, and conditioning for return to activities of reaching overhead, perform cooking, perform dressing and self care activities, and performing lifting/carrying activities. 4. The patient will show a decrease in pain level to <2/10 to improve ability to return to activities of reaching overhead, perform cooking, perform dressing and self care activities, and performing lifting/carrying activities. 5. The patient will improve QuickDASH score to <19/55 to reflect an improvement in this patients self reported functional ability. Rehabilitation Potential For Stated Goals: Good  Regarding Elmira Multani's therapy, I certify that the treatment plan above will be carried out by a therapist or under their direction. Thank you for this referral,  Dc Serra , DPT, OCS, FAAOMPT, CSCS     Referring Physician Signature: Woodrow Dow MD              Date                    The information in this section was collected on 2/28/17 (except where otherwise noted). HISTORY:   History of Present Injury/Illness (Reason for Referral): The patient comes to physical therapy clinic reporting bilateral shoulder pain with R worse than L. Pain started about a year ago with no known trauma or injury. The patient reports symptoms located subacromial and posterior in both shoulders. Tigre Carlos of symptoms reported as dull most of the time but sharp with certain movements. Symptoms are reported as getting a little better over the last 3-4 weeks. Average pain level reported as 3/10 and worst pain a 5/10. Aggravating factors include reaching overhead, lifting, carrying, reaching behind back, and reaching up to fix hair.  Relieving factors include massage, heat, ice, tylenol, rest. Imaging performed includes none at this time. Past Medical History/Comorbidities:   Ms. Bibi Mallory  has a past medical history of Bronchitis; Cyst in hand (11/26/2013); DJD (degenerative joint disease); History of rectal bleeding (11/26/2013); HTN (hypertension) (11/26/2013); Hyperlipidemia (11/26/2013); Rheumatoid arthritis(714.0) (11/26/2013); URI (upper respiratory infection); and Vitamin D deficiency (11/26/2013). Ms. Bibi Mallory  has a past surgical history that includes knee replacement (Bilateral). 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 , Disp: , Rfl:     amLODIPine (NORVASC) 5 mg tablet, TAKE ONE TABLET BY MOUTH ONCE DAILY, Disp: 90 Tab, Rfl: 3    valsartan (DIOVAN) 160 mg tablet, Take 1 Tab by mouth daily. , Disp: 90 Tab, Rfl: 4    Cholecalciferol, Vitamin D3, 50,000 unit cap, Take 1 Cap by mouth every seven (7) days. , Disp: 12 Cap, Rfl: 12    metoprolol tartrate (LOPRESSOR) 100 mg IR tablet, Take 1 Tab by mouth two (2) times a day., Disp: 180 Tab, Rfl: 3    VARICELLA-ZOSTER VIRUS INFECTION PROPHYLAXIS 19,400 unit/0.65 mL susr injection, , Disp: , Rfl:     methotrexate (RHEUMATREX) 2.5 mg tablet, , Disp: , Rfl:     alendronate (FOSAMAX) 70 mg tablet, , Disp: , Rfl:     FOLIC ACID PO, Take  by mouth., Disp: , Rfl:     FLAXSEED OIL, by Does Not Apply route., Disp: , Rfl:     CHOLECALCIFEROL, VITAMIN D3, (VITAMIN D3 PO), Take  by mouth., Disp: , Rfl:    Date Last Reviewed:  3/24/2017    Number of Personal Factors/Comorbidities that affect the Plan of Care: 1-2: MODERATE COMPLEXITY   EXAMINATION:      Observation/Orthostatic:  In Standing    Head Position Forward   Thoracic Observation Slight kyphosis   Scapular Position Elevated R with tense R upper trap   Palpation: tender subacromial space, infraspinatus, and biceps tendon bilaterally    Passive Joint Mobility:  (unable due to muscle guarding from patient)  Joint Right Left   Glenohumeral Joint     Scapulothoracic Joint     Range of Motion:  ROM in degrees AROM Right AROM Left PROM Right PROM Left   Shoulder Flexion 130 pain 125 mild pain  160 mild pain 165 mild pain   Shoulder Abduction 125 pain 135 mild pain 140 mild pain 150 mild pain   Shoulder Extension WFL; mild pain [facility]   Shoulder Internal Rotation (IR)  (reaching up back) Up to L3 Up to L1     Shoulder Internal Rotation (IR)  (45 degrees of abduction)   45 50   Shoulder External Rotation (ER)  (measured in scapular plane)   80 85   ER (measured at 0 degrees of abduction) pain pain     Strength:    Right Left   Shoulder Flexion 4-/5 pain 4-/5 pain   Shoulder Extension 4/5 4/5   Shoulder Abduction 4-/5 pain 4-/5 pain   Shoulder Adduction 4/5 4/5   Shoulder ER 4-/5 pain 4-/5 pain   Shoulder IR 4/5 4/5         Body Structures Involved:  1. Bones  2. Joints  3. Muscles  4. Ligaments Body Functions Affected:  1. Neuromusculoskeletal  2. Movement Related Activities and Participation Affected:  1. General Tasks and Demands  2. Self Care  3. Domestic Life  4. Community, Social and [place]   Number of elements (examined above) that affect the Plan of Care: 4+: HIGH COMPLEXITY   CLINICAL PRESENTATION:   Presentation: Evolving clinical presentation with changing clinical characteristics: MODERATE COMPLEXITY   CLINICAL DECISION MAKING:   Outcome Measure: Tool Used: Disabilities of the Arm, Shoulder and Hand (DASH) Questionnaire - Quick Version  Score:  Initial: 29/55  Most Recent: X/55 (Date: -- )   Interpretation of Score: The DASH is designed to measure the activities of daily living in person's with upper extremity dysfunction or pain.   Each section is scored on a 1-5 scale, 5 representing the greatest disability. The scores of each section are added together for a total score of 55. Score 11 12-19 20-28 29-37 38-45 46-54 55   Modifier CH CI CJ CK CL CM CN     ? Carrying, Moving, and Handling Objects:     - CURRENT STATUS: CK - 40%-59% impaired, limited or restricted    - GOAL STATUS: CI - 1%-19% impaired, limited or restricted    - D/C STATUS:  ---------------To be determined---------------    Medical Necessity:   · Patient demonstrates good rehab potential due to higher previous functional level. Reason for Services/Other Comments:  · Patient continues to require skilled intervention due to continued functional limitations due to continued impairments. Use of outcome tool(s) and clinical judgement create a POC that gives a: Questionable prediction of patient's progress: MODERATE COMPLEXITY            TREATMENT:   (In addition to Assessment/Re-Assessment sessions the following treatments were rendered)    Pre-treatment Symptoms/Complaints:  (3/24/2017)  Pt reports she feels like she is progressing well with shoulder function with fixing her hair and dressing. Pain: Initial:   Pain Intensity 1: 4/10 Post Session:  3/10     Therapeutic Exercise: (60 Minutes):  Exercises per grid below to improve mobility, strength and coordination. Required minimal visual and verbal cues to promote proper body posture and promote proper body mechanics. Progressed exercises as indicated. Date:  3/24/2017    Activity/Exercise Parameters   UE Bike 8 min   Supine AROM Flexion 1x30  2 lbs   Supine External Rotation (ER)/Internal Rotation (IR) 1x30  3 lbs   Shoulder Press 3x10  1 lbs   Sidelying Arm Bar 4/30 sec  6 lbs   Sidelying ER 2x10  1 lb   [place]ion  3x10  1 lbs                 Manual Therapy (     ): Manual techniques to facilitate improved motion and decreased pain. · None today    Treatment/Session Assessment:  (3/24/2017)  Pt shows increased muscle function with overhead exercises. She she shows good progress with scapulohumeral coordination but still limited with fatigue. · Response to Treatment:  No irritation reported   · Compliance with Program/Exercises: Will assess as treatment progresses. · Recommendations/Intent for next treatment session: \"Next visit will focus on advancements to more challenging activities\".   Total Treatment Duration:  PT Patient Time In/Time Out  Time In: 1030  Time Out: [address] , DPT, OCS, FAAOMPT, CSCS

## 2017-03-29 ENCOUNTER — HOSPITAL ENCOUNTER (OUTPATIENT)
Dept: PHYSICAL THERAPY | Age: 72
Discharge: HOME | End: 2017-03-29
Attending: INTERNAL MEDICINE
Payer: MEDICARE

## 2017-03-29 PROCEDURE — 97110 THERAPEUTIC EXERCISES: CPT

## 2017-03-29 NOTE — PROGRESS NOTES
Junior Smyth  : [facility] at [address] [place] [address]  [phone]   [phone]        OUTPATIENT PHYSICAL THERAPY:Daily Note 3/29/2017    ICD-10: Treatment Diagnosis: Pain in Joint, R Shoulder- M25.511  Pain in Joint, L Shoulder- M25.512  Precautions/Allergies:   Review of patient's allergies indicates no known allergies. Fall Risk Score: 0 (? 5 = High Risk)  MD Orders: Eval and Treat MEDICAL/REFERRING DIAGNOSIS:  Persons encountering health services in other specified circumstances [Z76.89]  DATE OF ONSET: Chronic for the past year  REFERRING PHYSICIAN: Luis F Phelan MD  RETURN PHYSICIAN APPOINTMENT: 3/28/17     INITIAL ASSESSMENT:  Ms. Sonia Boone presents with signs and symptoms consistent with R and L supraspinatus tendinopathy and muscle guarding/spasms with loss of ROM due to pain and muscle dysfunction. PROBLEM LIST (Impacting functional limitations):  1. Decreased Strength  2. Decreased ADL/Functional Activities  3. Increased Pain  4. Decreased Activity Tolerance  5. Decreased Flexibility/Joint Mobility INTERVENTIONS PLANNED:  1. Home Exercise Program (HEP)  2. Manual Therapy  3. Neuromuscular Re-education/Strengthening  4. Range of Motion (ROM)  5. Therapeutic Activites  6. Therapeutic Exercise/Strengthening   TREATMENT PLAN:  Effective Dates: 17 TO 17. Frequency/Duration: 2 times a week for 8 weeks  GOALS: (Goals have been discussed and agreed upon with patient.)  SHORT-TERM FUNCTIONAL GOALS: Time Frame: 3/14/2017  1. The patient will be independent with a basic home exercise program(HEP) to address pain, AROM, and muscle function. 2. The patient will show a decrease in pain level to <3/10. DISCHARGE GOALS: Time Frame: 2017  1. The patient will be independent with an advanced home exercise program(HEP) to address pain, AROM, and muscle function.   2. The patient will show improvement of AROM to within functional limits to improve ability to reach overhead, reach behind back, and perform self care activities. 3. The patient will show a return of bilateral shoulder girdle muscle strength to >4+/5 and improvements in muscle coordination, control, and conditioning for return to activities of reaching overhead, perform cooking, perform dressing and self care activities, and performing lifting/carrying activities. 4. The patient will show a decrease in pain level to <2/10 to improve ability to return to activities of reaching overhead, perform cooking, perform dressing and self care activities, and performing lifting/carrying activities. 5. The patient will improve QuickDASH score to <19/55 to reflect an improvement in this patients self reported functional ability. Rehabilitation Potential For Stated Goals: Good  Regarding Elmira Multani's therapy, I certify that the treatment plan above will be carried out by a therapist or under their direction. Thank you for this referral,  Yuri Garcia , DPT, OCS, FAAOMPT, CSCS     Referring Physician Signature: James Brush MD              Date                    The information in this section was collected on 2/28/17 (except where otherwise noted). HISTORY:   History of Present Injury/Illness (Reason for Referral): The patient comes to physical therapy clinic reporting bilateral shoulder pain with R worse than L. Pain started about a year ago with no known trauma or injury. The patient reports symptoms located subacromial and posterior in both shoulders. Carlito Gonzalezano of symptoms reported as dull most of the time but sharp with certain movements. Symptoms are reported as getting a little better over the last 3-4 weeks. Average pain level reported as 3/10 and worst pain a 5/10. Aggravating factors include reaching overhead, lifting, carrying, reaching behind back, and reaching up to fix hair.  Relieving factors include massage, heat, ice, tylenol, rest. Imaging performed includes none at this time. Past Medical History/Comorbidities:   Ms. Maribell Marshall  has a past medical history of Bronchitis; Cyst in hand (11/26/2013); DJD (degenerative joint disease); History of rectal bleeding (11/26/2013); HTN (hypertension) (11/26/2013); Hyperlipidemia (11/26/2013); Rheumatoid arthritis(714.0) (11/26/2013); URI (upper respiratory infection); and Vitamin D deficiency (11/26/2013). Ms. Maribell Marshall  has a past surgical history that includes knee replacement (Bilateral). 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 , Disp: 12 Cap, Rfl: 12    levothyroxine (SYNTHROID) 50 mcg tablet, Take  by mouth Daily (before breakfast). , Disp: , Rfl:     amLODIPine (NORVASC) 5 mg tablet, TAKE ONE TABLET BY MOUTH ONCE DAILY, Disp: 90 Tab, Rfl: 3    valsartan (DIOVAN) 160 mg tablet, Take 1 Tab by mouth daily. , Disp: 90 Tab, Rfl: 4    VARICELLA-ZOSTER VIRUS INFECTION PROPHYLAXIS 19,400 unit/0.65 mL susr injection, , Disp: , Rfl:     methotrexate (RHEUMATREX) 2.5 mg tablet, , Disp: , Rfl:     alendronate (FOSAMAX) 70 mg tablet, , Disp: , Rfl:     FOLIC ACID PO, Take  by mouth., Disp: , Rfl:    Date Last Reviewed:  3/29/2017    Number of Personal Factors/Comorbidities that affect the Plan of Care: 1-2: MODERATE COMPLEXITY   EXAMINATION:      Observation/Orthostatic:  In Standing    Head Position Forward   Thoracic Observation Slight kyphosis   Scapular Position Elevated R with tense R upper trap   Palpation: tender subacromial space, infraspinatus, and biceps tendon bilaterally    Passive Joint Mobility:  (unable due to muscle guarding from patient)  Joint Right Left   Glenohumeral Joint     Scapulothoracic Joint     Range of Motion:  ROM in degrees AROM Right AROM Left PROM Right PROM Left   Shoulder Flexion 130 pain 125 mild pain  160 mild pain 165 mild pain   Shoulder Abduction 125 pain 135 mild pain 140 mild pain 150 mild pain   Shoulder Extension WFL; mild pain [facility]   Shoulder Internal Rotation (IR)  (reaching up back) Up to L3 Up to L1     Shoulder Internal Rotation (IR)  (45 degrees of abduction)   45 50   Shoulder External Rotation (ER)  (measured in scapular plane)   80 85   ER (measured at 0 degrees of abduction) pain pain     Strength:    Right Left   Shoulder Flexion 4-/5 pain 4-/5 pain   Shoulder Extension 4/5 4/5   Shoulder Abduction 4-/5 pain 4-/5 pain   Shoulder Adduction 4/5 4/5   Shoulder ER 4-/5 pain 4-/5 pain   Shoulder IR 4/5 4/5         Body Structures Involved:  1. Bones  2. Joints  3. Muscles  4. Ligaments Body Functions Affected:  1. Neuromusculoskeletal  2. Movement Related Activities and Participation Affected:  1. General Tasks and Demands  2. Self Care  3. Domestic Life  4. Community, Social and [place]   Number of elements (examined above) that affect the Plan of Care: 4+: HIGH COMPLEXITY   CLINICAL PRESENTATION:   Presentation: Evolving clinical presentation with changing clinical characteristics: MODERATE COMPLEXITY   CLINICAL DECISION MAKING:   Outcome Measure: Tool Used: Disabilities of the Arm, Shoulder and Hand (DASH) Questionnaire - Quick Version  Score:  Initial: 29/55  Most Recent: X/55 (Date: -- )   Interpretation of Score: The DASH is designed to measure the activities of daily living in person's with upper extremity dysfunction or pain. Each section is scored on a 1-5 scale, 5 representing the greatest disability. The scores of each section are added together for a total score of 55.     Score 11 12-19 20-28 29-37 38-45 46-54 55   Modifier CH CI CJ CK CL CM CN     ? Carrying, Moving, and Handling Objects:     - CURRENT STATUS: CK - 40%-59% impaired, limited or restricted    - GOAL STATUS: CI - 1%-19% impaired, limited or restricted    - D/C STATUS:  ---------------To be determined---------------    Medical Necessity:   · Patient demonstrates good rehab potential due to higher previous functional level. Reason for Services/Other Comments:  · Patient continues to require skilled intervention due to continued functional limitations due to continued impairments. Use of outcome tool(s) and clinical judgement create a POC that gives a: Questionable prediction of patient's progress: MODERATE COMPLEXITY            TREATMENT:   (In addition to Assessment/Re-Assessment sessions the following treatments were rendered)    Pre-treatment Symptoms/Complaints:  (3/29/2017)  Pt reports continued small steps of progress    Pain: Initial:   Pain Intensity 1: 3/10 Post Session:  3/10     Therapeutic Exercise: (60 Minutes):  Exercises per grid below to improve mobility, strength and coordination. Required minimal visual and verbal cues to promote proper body posture and promote proper body mechanics. Progressed exercises as indicated. Date:  3/29/2017    Activity/Exercise Parameters   UE Bike 8 min   Supine AROM Flexion 1x30  2 lbs   Supine External Rotation (ER)/Internal Rotation (IR) 1x30  3 lbs   Shoulder Press 3x10  1 lbs   Sidelying Arm Bar 4/30 sec  7 lbs   Sidelying ER 2x10  2 lb   Scaption  3x10  1 lbs                 Manual Therapy (     ): Manual techniques to facilitate improved motion and decreased pain. · None today    Treatment/Session Assessment:  (3/29/2017)  Pt shows increased muscle strength and conditioning with overhead exercises with less discomfort. · Response to Treatment:  No irritation reported   · Compliance with Program/Exercises: Will assess as treatment progresses.   · Recommendations/Intent for next treatment session: \"Next visit will focus on advancements to more challenging activities\".   Total Treatment Duration:  PT Patient Time In/Time Out  Time In: 1030  Time Out: 2050 Johnny Conti , SILVANAT, OCS, FAAOMPT, CSCS

## 2017-03-31 ENCOUNTER — HOSPITAL ENCOUNTER (OUTPATIENT)
Dept: PHYSICAL THERAPY | Age: 72
Discharge: HOME | End: 2017-03-31
Attending: INTERNAL MEDICINE
Payer: MEDICARE

## 2017-03-31 PROCEDURE — G8985 CARRY GOAL STATUS: HCPCS

## 2017-03-31 PROCEDURE — G8986 CARRY D/C STATUS: HCPCS

## 2017-03-31 PROCEDURE — 97110 THERAPEUTIC EXERCISES: CPT

## 2017-03-31 NOTE — PROGRESS NOTES
Babs Ray  : [facility] [address]  [phone]   [phone]        OUTPATIENT PHYSICAL THERAPY:Daily Note and Discharge 3/31/2017    ICD-10: Treatment Diagnosis: Pain in Joint, R Shoulder- M25.511  Pain in Joint, L Shoulder- M25.512  Precautions/Allergies:   Review of patient's allergies indicates no known allergies. Fall Risk Score: 0 (? 5 = High Risk)  MD Orders: Eval and Treat MEDICAL/REFERRING DIAGNOSIS:  Persons encountering health services in other specified circumstances [Z76.89]  DATE OF ONSET: Chronic for the past year  REFERRING PHYSICIAN: Christ Ladd MD  RETURN PHYSICIAN APPOINTMENT: 3/28/17     INITIAL ASSESSMENT:  Ms. Alena Mcgraw presents with signs and symptoms consistent with R and L supraspinatus tendinopathy and muscle guarding/spasms with loss of ROM due to pain and muscle dysfunction. The patient shows very good progression with muscle strength, muscle coordination, ROM, and functional ability. Her pain has significantly reduced but continues to have some pain with certain positions and activities. She has a good understanding of her HEP and requests DC at this time due to her progression and her understanding of HEP. SHORT-TERM FUNCTIONAL GOALS: Time Frame: 3/14/2017  1. The patient will be independent with a basic home exercise program(HEP) to address pain, AROM, and muscle function. MET  2. The patient will show a decrease in pain level to <3/10. MET  DISCHARGE GOALS: Time Frame: 2017  1. The patient will be independent with an advanced home exercise program(HEP) to address pain, AROM, and muscle function. MET  2. The patient will show improvement of AROM to within functional limits to improve ability to reach overhead, reach behind back, and perform self care activities. MET  3.  The patient will show a return of bilateral shoulder girdle muscle strength to >4+/5 and improvements in muscle coordination, control, and conditioning for return to activities of reaching overhead, perform cooking, perform dressing and self care activities, and performing lifting/carrying activities. PARTIALLY MET  4. The patient will show a decrease in pain level to <2/10 to improve ability to return to activities of reaching overhead, perform cooking, perform dressing and self care activities, and performing lifting/carrying activities. PARTIALLY MET  5. The patient will improve QuickDASH score to <19/55 to reflect an improvement in this patients self reported functional ability. MET    Regarding Zeketano SLAUGHTER Multani's therapy, I certify that the treatment plan above will be carried out by a therapist or under their direction. Thank you for this referral,  Farrah Bonilla , DPT, OCS, FAAOMPT, CSCS     Referring Physician Signature: Philip Coleman MD              Date                    The information in this section was collected on 2/28/17 (except where otherwise noted). HISTORY:   History of Present Injury/Illness (Reason for Referral): The patient comes to physical therapy clinic reporting bilateral shoulder pain with R worse than L. Pain started about a year ago with no known trauma or injury. The patient reports symptoms located subacromial and posterior in both shoulders. Geralene Sears of symptoms reported as dull most of the time but sharp with certain movements. Symptoms are reported as getting a little better over the last 3-4 weeks. Average pain level reported as 3/10 and worst pain a 5/10. Aggravating factors include reaching overhead, lifting, carrying, reaching behind back, and reaching up to fix hair. Relieving factors include massage, heat, ice, tylenol, rest. Imaging performed includes none at this time. Past Medical History/Comorbidities:   Ms. Catie Marino  has a past medical history of Bronchitis; Cyst in hand (11/26/2013); DJD (degenerative joint disease);  History of rectal bleeding (11/26/2013); HTN (hypertension) (11/26/2013); Hyperlipidemia (11/26/2013); Rheumatoid arthritis(714.0) (11/26/2013); URI (upper respiratory infection); and Vitamin D deficiency (11/26/2013). Ms. Sonia Boone  has a past surgical history that includes knee replacement (Bilateral). 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 , Disp: 12 Cap, Rfl: 12    levothyroxine (SYNTHROID) 50 mcg tablet, Take  by mouth Daily (before breakfast). , Disp: , Rfl:     amLODIPine (NORVASC) 5 mg tablet, TAKE ONE TABLET BY MOUTH ONCE DAILY, Disp: 90 Tab, Rfl: 3    valsartan (DIOVAN) 160 mg tablet, Take 1 Tab by mouth daily. , Disp: 90 Tab, Rfl: 4    VARICELLA-ZOSTER VIRUS INFECTION PROPHYLAXIS 19,400 unit/0.65 mL susr injection, , Disp: , Rfl:     methotrexate (RHEUMATREX) 2.5 mg tablet, , Disp: , Rfl:     alendronate (FOSAMAX) 70 mg tablet, , Disp: , Rfl:     FOLIC ACID PO, Take  by mouth., Disp: , Rfl:    Date Last Reviewed:  3/31/2017    Number of Personal Factors/Comorbidities that affect the Plan of Care: 1-2: MODERATE COMPLEXITY   EXAMINATION:      Observation/Orthostatic:  In Standing  3/31/17   Head Position Forward    Thoracic Observation Slight kyphosis    Scapular Position Elevated R with tense R upper trap Normalized   Palpation: tender subacromial space, infraspinatus, and biceps tendon bilaterally      Range of Motion:  ROM in degrees AROM Right AROM Left PROM Right PROM Left AROM Right  3/31/17 AROM Left  3/31/17   Shoulder Flexion 130 pain 125 mild pain  160 mild pain 165 mild pain Full Full   Shoulder Abduction 125 pain 135 mild pain 140 mild pain 150 mild pain Full Full   Shoulder Extension WFL; mild pain WFL [facility]   Shoulder Internal Rotation (IR)  (reaching up back) Up to L3 Up to L1   Full Full   Shoulder Internal Rotation (IR)  (45 degrees of abduction)   45 50     Shoulder External Rotation (ER)  (measured in scapular plane)   80 85     ER (measured at 0 degrees of abduction) pain pain   Full Full   Strength:    Right Left Right  3/31/17 Left  3/31/17   Shoulder Flexion 4-/5 pain 4-/5 pain 4+/5 4+/5   Shoulder Extension 4/5 4/5 5 5   Shoulder Abduction 4-/5 pain 4-/5 pain 4+ 4+   Shoulder Adduction 4/5 4/5 5 5   Shoulder ER 4-/5 pain 4-/5 pain 4+ 4+   Shoulder IR 4/5 4/5 5 5         Body Structures Involved:  1. Bones  2. Joints  3. Muscles  4. Ligaments Body Functions Affected:  1. Neuromusculoskeletal  2. Movement Related Activities and Participation Affected:  1. General Tasks and Demands  2. Self Care  3. Domestic Life  4. Community, Social and Tiona Whittaker   Number of elements (examined above) that affect the Plan of Care: 4+: HIGH COMPLEXITY   CLINICAL PRESENTATION:   Presentation: Evolving clinical presentation with changing clinical characteristics: MODERATE COMPLEXITY   CLINICAL DECISION MAKING:   Outcome Measure: Tool Used: Disabilities of the Arm, Shoulder and Hand (DASH) Questionnaire - Quick Version  Score:  Initial: 29/55  Most Recent: 19/55 (Date: 3/31/17 )   Interpretation of Score: The DASH is designed to measure the activities of daily living in person's with upper extremity dysfunction or pain. Each section is scored on a 1-5 scale, 5 representing the greatest disability. The scores of each section are added together for a total score of 55. Score 11 12-19 20-28 29-37 38-45 46-54 55   Modifier CH CI CJ CK CL CM CN     ?  Carrying, Moving, and Handling Objects:     - CURRENT STATUS: ---------------To be determined---------------    - GOAL STATUS: CI - 1%-19% impaired, limited or restricted    - D/C STATUS:  CI - 1%-19% impaired, limited or restricted     Use of outcome tool(s) and clinical judgement create a POC that gives a: Questionable prediction of patient's progress: MODERATE COMPLEXITY            TREATMENT:   (In addition to Assessment/Re-Assessment sessions the following treatments were rendered)    Pre-treatment Symptoms/Complaints:  (3/31/2017)  Pt reports she feels like her shoulder has progressed well with improved strength, ROM, and less pain. She requests DC due to she feels she has progressed well and knows how to continue with HEP at this time. Pain: Initial:   Pain Intensity 1: 3/10 Post Session:  3/10     Therapeutic Exercise: (60 Minutes):  Exercises per grid below to improve mobility, strength and coordination. Required minimal visual and verbal cues to promote proper body posture and promote proper body mechanics. Progressed exercises as indicated. Date:  3/31/2017    Activity/Exercise Parameters   UE Bike 8 min   Supine AROM Flexion 1x30  2 lbs   Supine External Rotation (ER)/Internal Rotation (IR) 1x30  3 lbs   Shoulder Press 3x10  1 lbs   Sidelying Arm Bar 4/30 sec  7 lbs   Sidelying ER 2x10  2 lb   Scaption  3x10  1 lbs                 Manual Therapy (     ): Manual techniques to facilitate improved motion and decreased pain. · None today    Treatment/Session Assessment:  (3/31/2017)  Pt shows good progress with muscle strength, coordination, and conditioning. She has significantly improved load tolerance of shoulder musculature with minimal pain. She has a good understanding of her HEP and is ready for DC.      · Response to Treatment:  No irritation reported     Total Treatment Duration:  PT Patient Time In/Time Out  Time In: 1030  Time Out: [address] , DPT, OCS, FAAOMPT, CSCS

## 2018-02-22 ENCOUNTER — HOSPITAL ENCOUNTER (OUTPATIENT)
Dept: MAMMOGRAPHY | Age: 73
Discharge: HOME | End: 2018-02-22
Attending: INTERNAL MEDICINE
Payer: MEDICARE

## 2018-02-22 DIAGNOSIS — Z12.31 VISIT FOR SCREENING MAMMOGRAM: ICD-10-CM

## 2018-02-22 PROCEDURE — 77067 SCR MAMMO BI INCL CAD: CPT

## 2018-04-06 ENCOUNTER — HOSPITAL ENCOUNTER (OUTPATIENT)
Dept: PHYSICAL THERAPY | Age: 73
Discharge: HOME | End: 2018-04-06
Payer: MEDICARE

## 2018-04-06 PROCEDURE — G8985 CARRY GOAL STATUS: HCPCS

## 2018-04-06 PROCEDURE — 97161 PT EVAL LOW COMPLEX 20 MIN: CPT

## 2018-04-06 PROCEDURE — G8984 CARRY CURRENT STATUS: HCPCS

## 2018-04-06 NOTE — THERAPY EVALUATION
Rylan Welch  : 1945  [place]  [place] [facility] at [address] [place] [address]  [phone]   [phone]       OUTPATIENT PHYSICAL THERAPY:Initial Assessment 2018    ICD-10: Treatment Diagnosis:  Pain in joint, shoulder,Right M25.511, Other Arthritis, monoarthritis, not elsewhere classified, shoulder, Right M13.111, Pain in limb, unspecified, arm, Right M79.601  Precautions/Allergies:   Review of patient's allergies indicates no known allergies. Fall Risk Score: 0 (? 5 = High Risk)  MD Orders: Evaluate and treat; Chronic shoulder bursitis,right, AC joint arthropahty MEDICAL/REFERRING DIAGNOSIS:  Bursitis of right shoulder [M75.51]  Primary osteoarthritis, unspecified shoulder [M19.019]   DATE OF ONSET: Chronic  REFERRING PHYSICIAN: Alondra Baig MD  RETURN PHYSICIAN APPOINTMENT:  Nothing scheduled     INITIAL ASSESSMENT:  Ms. Ramirez Stiles presents with decreased right shoulder A/PROM strength and ROM limiting daily functional mobility and tolerance to activity due to increased pain. Pt has had therapy in the past which has helped pain levels. PROBLEM LIST (Impacting functional limitations):  1. Decreased Strength  2. Decreased ADL/Functional Activities  3. Increased Pain  4. Decreased Activity Tolerance  5. Decreased Flexibility/Joint Mobility INTERVENTIONS PLANNED:  1. Cold  2. Home Exercise Program (HEP)  3. Manual Therapy  4. Neuromuscular Re-education/Strengthening  5. Therapeutic Exercise/Strengthening   TREATMENT PLAN:  Effective Dates: 2018 TO 2018 (60 days). Frequency/Duration: 2 times a week for 60 Days  GOALS: (Goals have been discussed and agreed upon with patient.)  Short-Term Functional Goals: Time Frame: 18 30 days  1. Pt to demonstrate right shoulder flexion AROM to 170 degrees to allow for symmetrical movement.   2. Pt to demonstrate right shoulder abduction AROM to 170 degrees indicating improved joint mobility. 3. Pt to report decreased disability as per DASH with a score of 12-19. Discharge Goals: Time Frame: 60 days, 6/5/18  1. Pt to return to all household chores without increased pain/difficulty. 2. Pt to demonstrate right shoulder AROM measurements to equal left. 3. Pt to achieve independence with HEP. Rehabilitation Potential For Stated Goals: Good  Regarding Elmira Multani's therapy, I certify that the treatment plan above will be carried out by a therapist or under their direction. Thank you for this referral,  Divina Varner PT     Referring Physician Signature: Samreen Garner MD              Date                    The information in this section was collected on 4/6/18 (except where otherwise noted). HISTORY:   History of Present Injury/Illness (Reason for Referral): Pt was in therapy for the right and left shoulder back in 2017, with last appointment on 3/24/17 for R and L supraspinatus tendinopathy and muscle guarding/spasms. Pt stated that the therapy did help her regain most of her function however her pain never subsided completely and now returns with inability to do any heavy lifting or housework. Pt received 2 injections 2 weeks ago which she is not able to identify. Pt stated that the injections have not decreased her pain. Past Medical History/Comorbidities:   Ms. Kei Hendricks  has a past medical history of Bronchitis; Cyst in hand (11/26/2013); DJD (degenerative joint disease); History of rectal bleeding (11/26/2013); HTN (hypertension) (11/26/2013); Hyperlipidemia (11/26/2013); Rheumatoid arthritis(714.0) (11/26/2013); URI (upper respiratory infection); and Vitamin D deficiency (11/26/2013). Ms. Kei Hendricks  has a past surgical history that includes hx knee replacement (Bilateral). Social History/Living Environment: Pt and her  live with their daughter and her family.       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 Dominant Side:         RIGHT  Current Medications:       Current Outpatient Prescriptions:     methotrexate (RHEUMATREX) 2.5 mg tablet, Take 15 mg by mouth., Disp: , Rfl:     Cholecalciferol, Vitamin D3, 50,000 unit cap, Take 1 Cap by mouth every seven (7) days. , Disp: 12 Cap, Rfl: 12    metoprolol tartrate (LOPRESSOR) 100 mg IR tablet, Take 1 Tab by mouth two (2) times a day., Disp: 180 Tab, Rfl: 3    multivit-min-iron-FA-lutein (CENTRUM SILVER WOMEN) 8 mg iron-400 mcg-300 mcg tab, Take  by mouth., Disp: , Rfl:     FERROUS FUMARATE/VIT BCOMP,C (SUPER B COMPLEX PO), Take  by mouth., Disp: , Rfl:     calcium carb and citrat-mag ox 200 mg calcium- 50 mg tab, Take  by mouth., Disp: , Rfl:     valsartan (DIOVAN) 160 mg tablet, Take 1 Tab by mouth daily. , Disp: 90 Tab, Rfl: 4    amLODIPine (NORVASC) 5 mg tablet, TAKE ONE TABLET BY MOUTH ONCE DAILY, Disp: 90 Tab, Rfl: 3    levothyroxine (SYNTHROID) 50 mcg tablet, Take  by mouth Daily (before breakfast). , Disp: , Rfl:     alendronate (FOSAMAX) 70 mg tablet, , Disp: , Rfl:     FOLIC ACID PO, Take  by mouth., Disp: , Rfl:    Date Last Reviewed:  4/6/2018   Number of Personal Factors/Comorbidities that affect the Plan of Care: 0: LOW COMPLEXITY   EXAMINATION:   Observation/Orthostatic Postural Assessment:  Moderate forward shoulders     ROM: Gross active cervical spine rotation is 75% available.     AROM(PROM) in degrees Right Left   Shoulder flexion 130 120   Shoulder abduction (palm down) 130 170   Shoulder internal rotation (IR)  (measured at 90 degrees of abduction) 40 40   Shoulder external rotation (ER)  (measured in scapular plane) 50 45     Strength:   Manual Muscle Test (*/5) Right Left   Shoulder flexion 4- Pain 4 Pain   Shoulder extension 4 4   Shoulder abduction 4- Pain 4 Pain   Shoulder adduction 4 4   Shoulder ER 4 4   Shoulder IR 4 4   Elbow flexion 4 4   Elbow extension 4 4 Special Tests:    Impingement tests performed on the bilaterally shoulder:  Ambrocio-Ankur test: negative. Neer test: negative. Stability tests performed on the bilaterally shoulder:  Sulcus sign: negative. Apprehension test: negative. AC Compression/sheer test: negative. Body Structures Involved:  1. Bones  2. Muscles  3. Ligaments Body Functions Affected:  1. Sensory/Pain  2. Neuromusculoskeletal  3. Movement Related Activities and Participation Affected:  1. General Tasks and Demands  2. Mobility  3. Domestic Life   Number of elements (examined above) that affect the Plan of Care: 4+: HIGH COMPLEXITY   CLINICAL PRESENTATION:   Presentation: Stable and uncomplicated: LOW COMPLEXITY   CLINICAL DECISION MAKING:   Outcome Measure: Tool Used: Disabilities of the Arm, Shoulder and Hand (DASH) Questionnaire - Quick Version  Score:  Initial: 26/55  Most Recent: X/55 (Date: -- )   Interpretation of Score: The DASH is designed to measure the activities of daily living in person's with upper extremity dysfunction or pain. Each section is scored on a 1-5 scale, 5 representing the greatest disability. The scores of each section are added together for a total score of 55. Score 11 12-19 20-28 29-37 38-45 46-54 55   Modifier CH CI CJ CK CL CM CN     ? Carrying, Moving, and Handling Objects:     - CURRENT STATUS: CJ - 20%-39% impaired, limited or restricted    - GOAL STATUS: CI - 1%-19% impaired, limited or restricted    - D/C STATUS:  ---------------To be determined---------------    Medical Necessity:   · Patient demonstrates good rehab potential due to higher previous functional level.   Reason for Services/Other Comments:  · Patient will benefit from therapy at this time as she has had previous results in the past.   Use of outcome tool(s) and clinical judgement create a POC that gives a: Clear prediction of patient's progress: LOW COMPLEXITY            TREATMENT:   (In addition to Assessment/Re-Assessment sessions the following treatments were rendered)  Pre-treatment Symptoms/Complaints: \"My right shoulder has been bothering me quite a bit that I cannot do my housework\"  Pain: Initial:   Pain Intensity 1: 2  Pain Location 1: Shoulder  Pain Orientation 1: Right  Post Session:    2/10 right shoulder     Assessment only today, no treatment provided. Treatment/Session Assessment:    · Response to Treatment:  Pt tolerated initial evaluation today. Pt educated on sleeping positions and use of moist heat. · Compliance with Program/Exercises: Will assess as treatment progresses. · Recommendations/Intent for next treatment session: \"Next visit will focus on advancements to more challenging activities\".   Total Treatment Duration:  60 minutes  PT Patient Time In/Time Out  Time In: 1000  Time Out: 0[address] Angelica Moody

## 2018-04-06 NOTE — PROGRESS NOTES
Ambulatory/Rehab Services H2 Model Falls Risk Assessment    Risk Factor Pts. ·   Confusion/Disorientation/Impulsivity  []    4 ·   Symptomatic Depression  []   2 ·   Altered Elimination  []   1 ·   Dizziness/Vertigo  []   1 ·   Gender (Male)  []   1 ·   Any administered antiepileptics (anticonvulsants):  []   2 ·   Any administered benzodiazepines:  []   1 ·   Visual Impairment (specify):  []   1 ·   Portable Oxygen Use  []   1 ·   Orthostatic ? BP  []   1 ·   History of Recent Falls (within 3 mos.)  []   5     Ability to Rise from Chair (choose one) Pts. ·   Ability to rise in a single movement  [x]   0 ·   Pushes up, successful in one attempt  []   1 ·   Multiple attempts, but successful  []   3 ·   Unable to rise without assistance  []   4   Total: (5 or greater = High Risk) 0     Falls Prevention Plan:   []                Physical Limitations to Exercise (specify):   []                Mobility Assistance Device (type):   []                Exercise/Equipment Adaptation (specify):    ©2010 [facility] [address] Patent #[id number].  Federal Law prohibits the replication, distribution or use without written permission from [facility] [employer]

## 2018-04-09 ENCOUNTER — HOSPITAL ENCOUNTER (OUTPATIENT)
Dept: PHYSICAL THERAPY | Age: 73
Discharge: HOME | End: 2018-04-09
Payer: MEDICARE

## 2018-04-09 PROCEDURE — 97035 APP MDLTY 1+ULTRASOUND EA 15: CPT

## 2018-04-09 PROCEDURE — 97140 MANUAL THERAPY 1/> REGIONS: CPT

## 2018-04-09 PROCEDURE — 97110 THERAPEUTIC EXERCISES: CPT

## 2018-04-09 NOTE — PROGRESS NOTES
Princess Tronocso  : 1945  [place]  [place] [facility] at [address] [place] [address]  [phone]   [phone]       OUTPATIENT PHYSICAL THERAPY:Daily Note 2018    ICD-10: Treatment Diagnosis:  Pain in joint, shoulder,Right M25.511, Other Arthritis, monoarthritis, not elsewhere classified, shoulder, Right M13.111, Pain in limb, unspecified, arm, Right M79.601  Precautions/Allergies:   Review of patient's allergies indicates no known allergies. Fall Risk Score: 0 (? 5 = High Risk)  MD Orders: Evaluate and treat; Chronic shoulder bursitis,right, AC joint arthropahty MEDICAL/REFERRING DIAGNOSIS:  Bursitis of right shoulder [M75.51]  Primary osteoarthritis, unspecified shoulder [M19.019]   DATE OF ONSET: Chronic  REFERRING PHYSICIAN: Rufino Granger MD  RETURN PHYSICIAN APPOINTMENT:  Nothing scheduled     INITIAL ASSESSMENT:  Ms. Singh Ramos presents with decreased right shoulder A/PROM strength and ROM limiting daily functional mobility and tolerance to activity due to increased pain. Pt has had therapy in the past which has helped pain levels. PROBLEM LIST (Impacting functional limitations):  1. Decreased Strength  2. Decreased ADL/Functional Activities  3. Increased Pain  4. Decreased Activity Tolerance  5. Decreased Flexibility/Joint Mobility INTERVENTIONS PLANNED:  1. Cold  2. Home Exercise Program (HEP)  3. Manual Therapy  4. Neuromuscular Re-education/Strengthening  5. Therapeutic Exercise/Strengthening   TREATMENT PLAN:  Effective Dates: 2018 TO 2018 (60 days). Frequency/Duration: 2 times a week for 60 Days  GOALS: (Goals have been discussed and agreed upon with patient.)  Short-Term Functional Goals: Time Frame: 18 30 days  1. Pt to demonstrate right shoulder flexion AROM to 170 degrees to allow for symmetrical movement.   2. Pt to demonstrate right shoulder abduction AROM to 170 degrees indicating improved joint mobility. 3. Pt to report decreased disability as per DASH with a score of 12-19. Discharge Goals: Time Frame: 60 days, 6/5/18  1. Pt to return to all household chores without increased pain/difficulty. 2. Pt to demonstrate right shoulder AROM measurements to equal left. 3. Pt to achieve independence with HEP. Rehabilitation Potential For Stated Goals: Good  Regarding Elmira Multani's therapy, I certify that the treatment plan above will be carried out by a therapist or under their direction. Thank you for this referral,  Sanaz Glasgow PT     Referring Physician Signature: Chantal Cano MD              Date                    The information in this section was collected on 4/6/18 (except where otherwise noted). HISTORY:   History of Present Injury/Illness (Reason for Referral): Pt was in therapy for the right and left shoulder back in 2017, with last appointment on 3/24/17 for R and L supraspinatus tendinopathy and muscle guarding/spasms. Pt stated that the therapy did help her regain most of her function however her pain never subsided completely and now returns with inability to do any heavy lifting or housework. Pt received 2 injections 2 weeks ago which she is not able to identify. Pt stated that the injections have not decreased her pain. Past Medical History/Comorbidities:   Ms. Chanel Boyd  has a past medical history of Bronchitis; Cyst in hand (11/26/2013); DJD (degenerative joint disease); History of rectal bleeding (11/26/2013); HTN (hypertension) (11/26/2013); Hyperlipidemia (11/26/2013); Rheumatoid arthritis(714.0) (11/26/2013); URI (upper respiratory infection); and Vitamin D deficiency (11/26/2013). Ms. Chanel Boyd  has a past surgical history that includes hx knee replacement (Bilateral). Social History/Living Environment: Pt and her  live with their daughter and her family.       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 Dominant Side:         RIGHT  Current Medications:       Current Outpatient Prescriptions:     methotrexate (RHEUMATREX) 2.5 mg tablet, Take 15 mg by mouth., Disp: , Rfl:     Cholecalciferol, Vitamin D3, 50,000 unit cap, Take 1 Cap by mouth every seven (7) days. , Disp: 12 Cap, Rfl: 12    metoprolol tartrate (LOPRESSOR) 100 mg IR tablet, Take 1 Tab by mouth two (2) times a day., Disp: 180 Tab, Rfl: 3    multivit-min-iron-FA-lutein (CENTRUM SILVER WOMEN) 8 mg iron-400 mcg-300 mcg tab, Take  by mouth., Disp: , Rfl:     FERROUS FUMARATE/VIT BCOMP,C (SUPER B COMPLEX PO), Take  by mouth., Disp: , Rfl:     calcium carb and citrat-mag ox 200 mg calcium- 50 mg tab, Take  by mouth., Disp: , Rfl:     valsartan (DIOVAN) 160 mg tablet, Take 1 Tab by mouth daily. , Disp: 90 Tab, Rfl: 4    amLODIPine (NORVASC) 5 mg tablet, TAKE ONE TABLET BY MOUTH ONCE DAILY, Disp: 90 Tab, Rfl: 3    levothyroxine (SYNTHROID) 50 mcg tablet, Take  by mouth Daily (before breakfast). , Disp: , Rfl:     alendronate (FOSAMAX) 70 mg tablet, , Disp: , Rfl:     FOLIC ACID PO, Take  by mouth., Disp: , Rfl:    Date Last Reviewed:  4/9/2018   Number of Personal Factors/Comorbidities that affect the Plan of Care: 0: LOW COMPLEXITY   EXAMINATION:   Observation/Orthostatic Postural Assessment:  Moderate forward shoulders     ROM: Gross active cervical spine rotation is 75% available.     AROM(PROM) in degrees Right Left   Shoulder flexion 130 120   Shoulder abduction (palm down) 130 170   Shoulder internal rotation (IR)  (measured at 90 degrees of abduction) 40 40   Shoulder external rotation (ER)  (measured in scapular plane) 50 45     Strength:   Manual Muscle Test (*/5) Right Left   Shoulder flexion 4- Pain 4 Pain   Shoulder extension 4 4   Shoulder abduction 4- Pain 4 Pain   Shoulder adduction 4 4   Shoulder ER 4 4   Shoulder IR 4 4   Elbow flexion 4 4   Elbow extension 4 4   Special Tests: Impingement tests performed on the bilaterally shoulder:  Ambrocio-Marietta test: negative. Neer test: negative. Stability tests performed on the bilaterally shoulder:  Sulcus sign: negative. Apprehension test: negative. AC Compression/sheer test: negative. Body Structures Involved:  1. Bones  2. Muscles  3. Ligaments Body Functions Affected:  1. Sensory/Pain  2. Neuromusculoskeletal  3. Movement Related Activities and Participation Affected:  1. General Tasks and Demands  2. Mobility  3. Domestic Life   Number of elements (examined above) that affect the Plan of Care: 4+: HIGH COMPLEXITY   CLINICAL PRESENTATION:   Presentation: Stable and uncomplicated: LOW COMPLEXITY   CLINICAL DECISION MAKING:   Outcome Measure: Tool Used: Disabilities of the Arm, Shoulder and Hand (DASH) Questionnaire - Quick Version  Score:  Initial: 26/55  Most Recent: X/55 (Date: -- )   Interpretation of Score: The DASH is designed to measure the activities of daily living in person's with upper extremity dysfunction or pain. Each section is scored on a 1-5 scale, 5 representing the greatest disability. The scores of each section are added together for a total score of 55. Score 11 12-19 20-28 29-37 38-45 46-54 55   Modifier CH CI CJ CK CL CM CN     ? Carrying, Moving, and Handling Objects:     - CURRENT STATUS: CJ - 20%-39% impaired, limited or restricted    - GOAL STATUS: CI - 1%-19% impaired, limited or restricted    - D/C STATUS:  ---------------To be determined---------------    Medical Necessity:   · Patient demonstrates good rehab potential due to higher previous functional level.   Reason for Services/Other Comments:  · Patient will benefit from therapy at this time as she has had previous results in the past.   Use of outcome tool(s) and clinical judgement create a POC that gives a: Clear prediction of patient's progress: LOW COMPLEXITY            TREATMENT:   (In addition to Assessment/Re-Assessment sessions the following treatments were rendered)  Pre-treatment Symptoms/Complaints: \"My right shoulder has been bothering me quite a bit that I cannot do my housework\"  Pain: Initial:   Pain Intensity 1: 2  Pain Location 1: Shoulder  Pain Orientation 1: Right  Post Session:    2/10 right shoulder     Therapeutic Exercise: ( 20 minutes):  Exercises per  below to improve mobility and strength. Required minimal verbal cues to promote proper body posture. Progressed complexity of movement as indicated. Tband yellow supine flexion and extension x10  Scapular retraction in supine    Manual Therapy (   30 minutes  ): Manual techniques to facilitate improved motion and decreased pain. (Used abbreviations: MET - muscle energy technique; PNF - proprioceptive neuromuscular facilitation; NMR - neuromuscular re-education; a/p - anterior to posterior; p/a - posterior to anterior)   · Supine PROM to right shoulder in all available planes of motion  · Manual resistance into right shoulder IR/ER/extension/flexion x10/5 second hold  · GH mobilization grade II inferior and anterior  · Cervical spine PROM in supine and seated position  · Stretching to levator scapulae and upper trapezius    Ultrasound to left shoulder x 10 minutes with pt seated in a chair, 1.8 w/cm2, skin clear upon completion. Treatment/Session Assessment:    · Response to Treatment:  Pt tolerated initiation of manual therapy and exercises today. Pt encouraged to relax shoulders and utilize heat daily. Pt's daughter present during session. · Compliance with Program/Exercises: Will assess as treatment progresses. · Recommendations/Intent for next treatment session: \"Next visit will focus on advancements to more challenging activities\".   · Total Treatment Duration:  60 minutes  PT Patient Time In/Time Out  Time In: 1100  Time Out: 1500 [place] Shirlene Mallory PT

## 2018-04-11 ENCOUNTER — HOSPITAL ENCOUNTER (OUTPATIENT)
Dept: PHYSICAL THERAPY | Age: 73
Discharge: HOME | End: 2018-04-11
Payer: MEDICARE

## 2018-04-11 PROCEDURE — 97035 APP MDLTY 1+ULTRASOUND EA 15: CPT

## 2018-04-11 PROCEDURE — 97110 THERAPEUTIC EXERCISES: CPT

## 2018-04-11 PROCEDURE — 97140 MANUAL THERAPY 1/> REGIONS: CPT

## 2018-04-11 NOTE — PROGRESS NOTES
Kathy Padilla  : 1945  [place]  [place]: [place] [facility] at [address] [place] [address]  [phone]   [phone]       OUTPATIENT PHYSICAL THERAPY:Daily Note 2018    ICD-10: Treatment Diagnosis:  Pain in joint, shoulder,Right M25.511, Other Arthritis, monoarthritis, not elsewhere classified, shoulder, Right M13.111, Pain in limb, unspecified, arm, Right M79.601  Precautions/Allergies:   Review of patient's allergies indicates no known allergies. Fall Risk Score: 0 (? 5 = High Risk)  MD Orders: Evaluate and treat; Chronic shoulder bursitis,right, AC joint arthropahty MEDICAL/REFERRING DIAGNOSIS:  Bursitis of right shoulder [M75.51]  Primary osteoarthritis, unspecified shoulder [M19.019]   DATE OF ONSET: Chronic  REFERRING PHYSICIAN: Nathaniel Ferrera MD  RETURN PHYSICIAN APPOINTMENT:  Nothing scheduled     INITIAL ASSESSMENT:  Ms. Ashley Linn presents with decreased right shoulder A/PROM strength and ROM limiting daily functional mobility and tolerance to activity due to increased pain. Pt has had therapy in the past which has helped pain levels. PROBLEM LIST (Impacting functional limitations):  1. Decreased Strength  2. Decreased ADL/Functional Activities  3. Increased Pain  4. Decreased Activity Tolerance  5. Decreased Flexibility/Joint Mobility INTERVENTIONS PLANNED:  1. Cold  2. Home Exercise Program (HEP)  3. Manual Therapy  4. Neuromuscular Re-education/Strengthening  5. Therapeutic Exercise/Strengthening   TREATMENT PLAN:  Effective Dates: 2018 TO 2018 (60 days). Frequency/Duration: 2 times a week for 60 Days  GOALS: (Goals have been discussed and agreed upon with patient.)  Short-Term Functional Goals: Time Frame: 18 30 days  1. Pt to demonstrate right shoulder flexion AROM to 170 degrees to allow for symmetrical movement.   2. Pt to demonstrate right shoulder abduction AROM to 170 degrees indicating improved joint mobility. 3. Pt to report decreased disability as per DASH with a score of 12-19. Discharge Goals: Time Frame: 60 days, 6/5/18  1. Pt to return to all household chores without increased pain/difficulty. 2. Pt to demonstrate right shoulder AROM measurements to equal left. 3. Pt to achieve independence with HEP. Rehabilitation Potential For Stated Goals: Good  Regarding Zeketano SLAUGHTER Rangel's therapy, I certify that the treatment plan above will be carried out by a therapist or under their direction. Thank you for this referral,  Esteban Castellano PT                 The information in this section was collected on 4/6/18 (except where otherwise noted). HISTORY:   History of Present Injury/Illness (Reason for Referral): Pt was in therapy for the right and left shoulder back in 2017, with last appointment on 3/24/17 for R and L supraspinatus tendinopathy and muscle guarding/spasms. Pt stated that the therapy did help her regain most of her function however her pain never subsided completely and now returns with inability to do any heavy lifting or housework. Pt received 2 injections 2 weeks ago which she is not able to identify. Pt stated that the injections have not decreased her pain. Past Medical History/Comorbidities:   Ms. Miya Warren  has a past medical history of Bronchitis; Cyst in hand (11/26/2013); DJD (degenerative joint disease); History of rectal bleeding (11/26/2013); HTN (hypertension) (11/26/2013); Hyperlipidemia (11/26/2013); Rheumatoid arthritis(714.0) (11/26/2013); URI (upper respiratory infection); and Vitamin D deficiency (11/26/2013). Ms. Miya Warren  has a past surgical history that includes hx knee replacement (Bilateral). Social History/Living Environment: Pt and her  live with their daughter and her family. 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      Dominant Side: RIGHT  Current Medications:     Alendronate,amlodipine,calcium-vitamin D,methotrexate,metoprolol,multivitamin oral,super B50 complex oral, synthroid,valsartan      Date Last Reviewed:  4/11/2018   Number of Personal Factors/Comorbidities that affect the Plan of Care: 0: LOW COMPLEXITY   EXAMINATION:   Observation/Orthostatic Postural Assessment:  Moderate forward shoulders     ROM: Gross active cervical spine rotation is 75% available. AROM(PROM) in degrees Right Left   Shoulder flexion 130 120   Shoulder abduction (palm down) 130 170   Shoulder internal rotation (IR)  (measured at 90 degrees of abduction) 40 40   Shoulder external rotation (ER)  (measured in scapular plane) 50 45     Strength:   Manual Muscle Test (*/5) Right Left   Shoulder flexion 4- Pain 4 Pain   Shoulder extension 4 4   Shoulder abduction 4- Pain 4 Pain   Shoulder adduction 4 4   Shoulder ER 4 4   Shoulder IR 4 4   Elbow flexion 4 4   Elbow extension 4 4   Special Tests:    Impingement tests performed on the bilaterally shoulder:  Ambrocio-[place] test: negative. Neer test: negative. Stability tests performed on the bilaterally shoulder:  Sulcus sign: negative. Apprehension test: negative. AC Compression/sheer test: negative. Body Structures Involved:  1. Bones  2. Muscles  3. Ligaments Body Functions Affected:  1. Sensory/Pain  2. Neuromusculoskeletal  3. Movement Related Activities and Participation Affected:  1. General Tasks and Demands  2. Mobility  3. Domestic Life   Number of elements (examined above) that affect the Plan of Care: 4+: HIGH COMPLEXITY   CLINICAL PRESENTATION:   Presentation: Stable and uncomplicated: LOW COMPLEXITY   CLINICAL DECISION MAKING:   Outcome Measure: Tool Used: Disabilities of the Arm, Shoulder and Hand (DASH) Questionnaire - Quick Version  Score:  Initial: 26/55  Most Recent: X/55 (Date: -- )   Interpretation of Score:  The DASH is designed to measure the activities of daily living in person's with upper extremity dysfunction or pain. Each section is scored on a 1-5 scale, 5 representing the greatest disability. The scores of each section are added together for a total score of 55. Score 11 12-19 20-28 29-37 38-45 46-54 55   Modifier CH CI CJ CK CL CM CN     ? Carrying, Moving, and Handling Objects:     - CURRENT STATUS: CJ - 20%-39% impaired, limited or restricted    - GOAL STATUS: CI - 1%-19% impaired, limited or restricted    - D/C STATUS:  ---------------To be determined---------------    Medical Necessity:   · Patient demonstrates good rehab potential due to higher previous functional level. Reason for Services/Other Comments:  · Patient will benefit from therapy at this time as she has had previous results in the past.   Use of outcome tool(s) and clinical judgement create a POC that gives a: Clear prediction of patient's progress: LOW COMPLEXITY            TREATMENT:   (In addition to Assessment/Re-Assessment sessions the following treatments were rendered)  Pre-treatment Symptoms/Complaints:  No new complaints today  Pain: Initial:     5/10 right shoulder, left shoulder <5/10 Post Session:    2/10 right shoulder     Therapeutic Exercise: ( 20 minutes):  Exercises per  below to improve mobility and strength. Required minimal verbal cues to promote proper body posture. Progressed complexity of movement as indicated. Tband red supine flexion and extension x10  Scapular retraction in supine  Wand flexion in supine    Manual Therapy (   30 minutes  ): Manual techniques to facilitate improved motion and decreased pain.  (Used abbreviations: MET - muscle energy technique; PNF - proprioceptive neuromuscular facilitation; NMR - neuromuscular re-education; a/p - anterior to posterior; p/a - posterior to anterior)   · Supine PROM to right shoulder in all available planes of motion  · Manual resistance into right shoulder IR/ER/extension/flexion x10/5 second hold  · [facility] mobilization grade II inferior and anterior  · Cervical spine PROM in supine and seated position  · Stretching to levator scapulae and upper trapezius    Ultrasound to left shoulder x 10 minutes with pt seated in a chair, 1.8 w/cm2, skin clear upon completion. Treatment/Session Assessment:    · Response to Treatment:  Pt with improved tolerance to AROM to left shoulder noted today, pt with moderate amount of muscle guarding in left shoulder most evident when seated. · Compliance with Program/Exercises: Will assess as treatment progresses. · Recommendations/Intent for next treatment session: \"Next visit will focus on advancements to more challenging activities\".   · Total Treatment Duration:  60 minutes  PT Patient Time In/Time Out  Time In: 1254  Time Out: [address] [place]ing [place]

## 2018-04-16 ENCOUNTER — HOSPITAL ENCOUNTER (OUTPATIENT)
Dept: PHYSICAL THERAPY | Age: 73
Discharge: HOME | End: 2018-04-16
Payer: MEDICARE

## 2018-04-16 PROCEDURE — 97110 THERAPEUTIC EXERCISES: CPT

## 2018-04-16 PROCEDURE — 97140 MANUAL THERAPY 1/> REGIONS: CPT

## 2018-04-16 PROCEDURE — 97035 APP MDLTY 1+ULTRASOUND EA 15: CPT

## 2018-04-16 NOTE — PROGRESS NOTES
Lasha Recinos  : 1945  [place]  [place]: [place] Of [facility] at [address] [place] [address]  [phone]   [phone]       OUTPATIENT PHYSICAL THERAPY:Daily Note 2018    ICD-10: Treatment Diagnosis:  Pain in joint, shoulder,Right M25.511, Other Arthritis, monoarthritis, not elsewhere classified, shoulder, Right M13.111, Pain in limb, unspecified, arm, Right M79.601  Precautions/Allergies:   Review of patient's allergies indicates no known allergies. Fall Risk Score: 0 (? 5 = High Risk)  MD Orders: Evaluate and treat; Chronic shoulder bursitis,right, AC joint arthropahty MEDICAL/REFERRING DIAGNOSIS:  Bursitis of right shoulder [M75.51]  Primary osteoarthritis, unspecified shoulder [M19.019]   DATE OF ONSET: Chronic  REFERRING PHYSICIAN: Hannah Caldera MD  RETURN PHYSICIAN APPOINTMENT:  Nothing scheduled     INITIAL ASSESSMENT:  Ms. Derrek Osman presents with decreased right shoulder A/PROM strength and ROM limiting daily functional mobility and tolerance to activity due to increased pain. Pt has had therapy in the past which has helped pain levels. PROBLEM LIST (Impacting functional limitations):  1. Decreased Strength  2. Decreased ADL/Functional Activities  3. Increased Pain  4. Decreased Activity Tolerance  5. Decreased Flexibility/Joint Mobility INTERVENTIONS PLANNED:  1. Cold  2. Home Exercise Program (HEP)  3. Manual Therapy  4. Neuromuscular Re-education/Strengthening  5. Therapeutic Exercise/Strengthening   TREATMENT PLAN:  Effective Dates: 2018 TO 2018 (60 days). Frequency/Duration: 2 times a week for 60 Days  GOALS: (Goals have been discussed and agreed upon with patient.)  Short-Term Functional Goals: Time Frame: 18 30 days  1. Pt to demonstrate right shoulder flexion AROM to 170 degrees to allow for symmetrical movement.   2. Pt to demonstrate right shoulder abduction AROM to 170 degrees indicating improved joint mobility. 3. Pt to report decreased disability as per DASH with a score of 12-19. Discharge Goals: Time Frame: 60 days, 6/5/18  1. Pt to return to all household chores without increased pain/difficulty. 2. Pt to demonstrate right shoulder AROM measurements to equal left. 3. Pt to achieve independence with HEP. Rehabilitation Potential For Stated Goals: Good  Regarding Elmira SLAUGHTER Multani's therapy, I certify that the treatment plan above will be carried out by a therapist or under their direction. Thank you for this referral,  Aleshia Castillo PT                 The information in this section was collected on 4/6/18 (except where otherwise noted). HISTORY:   History of Present Injury/Illness (Reason for Referral): Pt was in therapy for the right and left shoulder back in 2017, with last appointment on 3/24/17 for R and L supraspinatus tendinopathy and muscle guarding/spasms. Pt stated that the therapy did help her regain most of her function however her pain never subsided completely and now returns with inability to do any heavy lifting or housework. Pt received 2 injections 2 weeks ago which she is not able to identify. Pt stated that the injections have not decreased her pain. Past Medical History/Comorbidities:   Ms. Dany Duncan  has a past medical history of Bronchitis; Cyst in hand (11/26/2013); DJD (degenerative joint disease); History of rectal bleeding (11/26/2013); HTN (hypertension) (11/26/2013); Hyperlipidemia (11/26/2013); Rheumatoid arthritis(714.0) (11/26/2013); URI (upper respiratory infection); and Vitamin D deficiency (11/26/2013). Ms. Dany Duncan  has a past surgical history that includes hx knee replacement (Bilateral). Social History/Living Environment: Pt and her  live with their daughter and her family. 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      Dominant Side: RIGHT  Current Medications:     Alendronate,amlodipine,calcium-vitamin D,methotrexate,metoprolol,multivitamin oral,super B50 complex oral, synthroid,valsartan      Date Last Reviewed:  4/16/2018   Number of Personal Factors/Comorbidities that affect the Plan of Care: 0: LOW COMPLEXITY   EXAMINATION:   Observation/Orthostatic Postural Assessment:  Moderate forward shoulders     ROM: Gross active cervical spine rotation is 75% available. AROM(PROM) in degrees Right Left   Shoulder flexion 130 120   Shoulder abduction (palm down) 130 170   Shoulder internal rotation (IR)  (measured at 90 degrees of abduction) 40 40   Shoulder external rotation (ER)  (measured in scapular plane) 50 45     Strength:   Manual Muscle Test (*/5) Right Left   Shoulder flexion 4- Pain 4 Pain   Shoulder extension 4 4   Shoulder abduction 4- Pain 4 Pain   Shoulder adduction 4 4   Shoulder ER 4 4   Shoulder IR 4 4   Elbow flexion 4 4   Elbow extension 4 4   Special Tests:    Impingement tests performed on the bilaterally shoulder:  Ambrocio-[place] test: negative. Neer test: negative. Stability tests performed on the bilaterally shoulder:  Sulcus sign: negative. Apprehension test: negative. AC Compression/sheer test: negative. Body Structures Involved:  1. Bones  2. Muscles  3. Ligaments Body Functions Affected:  1. Sensory/Pain  2. Neuromusculoskeletal  3. Movement Related Activities and Participation Affected:  1. General Tasks and Demands  2. Mobility  3. Domestic Life   Number of elements (examined above) that affect the Plan of Care: 4+: HIGH COMPLEXITY   CLINICAL PRESENTATION:   Presentation: Stable and uncomplicated: LOW COMPLEXITY   CLINICAL DECISION MAKING:   Outcome Measure: Tool Used: Disabilities of the Arm, Shoulder and Hand (DASH) Questionnaire - Quick Version  Score:  Initial: 26/55  Most Recent: X/55 (Date: -- )   Interpretation of Score:  The DASH is designed to measure the activities of daily living in person's with upper extremity dysfunction or pain. Each section is scored on a 1-5 scale, 5 representing the greatest disability. The scores of each section are added together for a total score of 55. Score 11 12-19 20-28 29-37 38-45 46-54 55   Modifier CH CI CJ CK CL CM CN     ? Carrying, Moving, and Handling Objects:     - CURRENT STATUS: CJ - 20%-39% impaired, limited or restricted    - GOAL STATUS: CI - 1%-19% impaired, limited or restricted    - D/C STATUS:  ---------------To be determined---------------    Medical Necessity:   · Patient demonstrates good rehab potential due to higher previous functional level. Reason for Services/Other Comments:  · Patient will benefit from therapy at this time as she has had previous results in the past.   Use of outcome tool(s) and clinical judgement create a POC that gives a: Clear prediction of patient's progress: LOW COMPLEXITY            TREATMENT:   (In addition to Assessment/Re-Assessment sessions the following treatments were rendered)  Pre-treatment Symptoms/Complaints:  Pt still does not cook at home due to fear of the pain returning . She has stated that since starting therapy she has noticed the frequency and intensity of the pain has decreased. Pain: Initial:   Pain Intensity 1: 2  Pain Location 1: Shoulder  Pain Orientation 1: Right 5/10 right shoulder, left shoulder <5/10 Post Session:    2/10 right shoulder     Therapeutic Exercise: ( 20 minutes):  Exercises per  below to improve mobility and strength. Required minimal verbal cues to promote proper body posture. Progressed complexity of movement as indicated. UBE Level 1 3' Forward/Backward  Tband red shoulder flexion,extension, and rows  2x10  Wand flexion in supine 2x10    Manual Therapy (   30 minutes  ): Manual techniques to facilitate improved motion and decreased pain.  (Used abbreviations: MET - muscle energy technique; PNF - proprioceptive neuromuscular facilitation; NMR - neuromuscular re-education; a/p - anterior to posterior; p/a - posterior to anterior)   · Supine PROM to right shoulder in all available planes of motion  · Pectoralis stretch to bilateral shoulders  · GH mobilization grade II inferior and anterior  · Cervical spine PROM in supine and seated position  · Stretching to levator scapulae and upper trapezius    Ultrasound to left shoulder x 10 minutes with pt seated in a chair, 1.8 w/cm2, skin clear upon completion. Treatment/Session Assessment:    · Response to Treatment:  Pt with overall decreased pain throughout arc of motion in supine with pain only when performing resisted thera band exercises into flexion. · Compliance with Program/Exercises: Will assess as treatment progresses. · Recommendations/Intent for next treatment session: \"Next visit will focus on advancements to more challenging activities\".   · Total Treatment Duration:  60 minutes  PT Patient Time In/Time Out  Time In: 1300  Time Out: [facility]

## 2018-04-18 ENCOUNTER — HOSPITAL ENCOUNTER (OUTPATIENT)
Dept: PHYSICAL THERAPY | Age: 73
Discharge: HOME | End: 2018-04-18
Payer: MEDICARE

## 2018-04-18 PROCEDURE — 97110 THERAPEUTIC EXERCISES: CPT

## 2018-04-18 PROCEDURE — 97140 MANUAL THERAPY 1/> REGIONS: CPT

## 2018-04-18 PROCEDURE — 97035 APP MDLTY 1+ULTRASOUND EA 15: CPT

## 2018-04-18 NOTE — PROGRESS NOTES
Sancho Young  : 1945  [place]  [place]: [place] [facility] at [address] [facility] [address] [place] [address]  [phone]   [phone]       OUTPATIENT PHYSICAL THERAPY:Daily Note 2018    ICD-10: Treatment Diagnosis:  Pain in joint, shoulder,Right M25.511, Other Arthritis, monoarthritis, not elsewhere classified, shoulder, Right M13.111, Pain in limb, unspecified, arm, Right M79.601  Precautions/Allergies:   Review of patient's allergies indicates no known allergies. Fall Risk Score: 0 (? 5 = High Risk)  MD Orders: Evaluate and treat; Chronic shoulder bursitis,right, AC joint arthropahty MEDICAL/REFERRING DIAGNOSIS:  Bursitis of right shoulder [M75.51]  Primary osteoarthritis, unspecified shoulder [M19.019]   DATE OF ONSET: Chronic  REFERRING PHYSICIAN: Cindy Huffman MD  RETURN PHYSICIAN APPOINTMENT:  Nothing scheduled     INITIAL ASSESSMENT:  Ms. Melissa Kwong presents with decreased right shoulder A/PROM strength and ROM limiting daily functional mobility and tolerance to activity due to increased pain. Pt has had therapy in the past which has helped pain levels. PROBLEM LIST (Impacting functional limitations):  1. Decreased Strength  2. Decreased ADL/Functional Activities  3. Increased Pain  4. Decreased Activity Tolerance  5. Decreased Flexibility/Joint Mobility INTERVENTIONS PLANNED:  1. Cold  2. Home Exercise Program (HEP)  3. Manual Therapy  4. Neuromuscular Re-education/Strengthening  5. Therapeutic Exercise/Strengthening   TREATMENT PLAN:  Effective Dates: 2018 TO 2018 (60 days). Frequency/Duration: 2 times a week for 60 Days  GOALS: (Goals have been discussed and agreed upon with patient.)  Short-Term Functional Goals: Time Frame: 18 30 days  1. Pt to demonstrate right shoulder flexion AROM to 170 degrees to allow for symmetrical movement.   2. Pt to demonstrate right shoulder abduction AROM to 170 degrees indicating improved joint mobility. 3. Pt to report decreased disability as per DASH with a score of 12-19. Discharge Goals: Time Frame: 60 days, 6/5/18  1. Pt to return to all household chores without increased pain/difficulty. 2. Pt to demonstrate right shoulder AROM measurements to equal left. 3. Pt to achieve independence with HEP. Rehabilitation Potential For Stated Goals: Good  Regarding Zeketano SLAUGHTER Multani's therapy, I certify that the treatment plan above will be carried out by a therapist or under their direction. Thank you for this referral,  Vicki Colbert PT                 The information in this section was collected on 4/6/18 (except where otherwise noted). HISTORY:   History of Present Injury/Illness (Reason for Referral): Pt was in therapy for the right and left shoulder back in 2017, with last appointment on 3/24/17 for R and L supraspinatus tendinopathy and muscle guarding/spasms. Pt stated that the therapy did help her regain most of her function however her pain never subsided completely and now returns with inability to do any heavy lifting or housework. Pt received 2 injections 2 weeks ago which she is not able to identify. Pt stated that the injections have not decreased her pain. Past Medical History/Comorbidities:   Ms. Mirna Beck  has a past medical history of Bronchitis; Cyst in hand (11/26/2013); DJD (degenerative joint disease); History of rectal bleeding (11/26/2013); HTN (hypertension) (11/26/2013); Hyperlipidemia (11/26/2013); Rheumatoid arthritis(714.0) (11/26/2013); URI (upper respiratory infection); and Vitamin D deficiency (11/26/2013). Ms. Mirna Beck  has a past surgical history that includes hx knee replacement (Bilateral). Social History/Living Environment: Pt and her  live with their daughter and her family. 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      Dominant Side: RIGHT  Current Medications:     Alendronate,amlodipine,calcium-vitamin D,methotrexate,metoprolol,multivitamin oral,super B50 complex oral, synthroid,valsartan      Date Last Reviewed:  4/18/2018   Number of Personal Factors/Comorbidities that affect the Plan of Care: 0: LOW COMPLEXITY   EXAMINATION:   Observation/Orthostatic Postural Assessment:  Moderate forward shoulders     ROM: Gross active cervical spine rotation is 75% available. AROM(PROM) in degrees Right Left   Shoulder flexion 130 120   Shoulder abduction (palm down) 130 170   Shoulder internal rotation (IR)  (measured at 90 degrees of abduction) 40 40   Shoulder external rotation (ER)  (measured in scapular plane) 50 45     Strength:   Manual Muscle Test (*/5) Right Left   Shoulder flexion 4- Pain 4 Pain   Shoulder extension 4 4   Shoulder abduction 4- Pain 4 Pain   Shoulder adduction 4 4   Shoulder ER 4 4   Shoulder IR 4 4   Elbow flexion 4 4   Elbow extension 4 4   Special Tests:    Impingement tests performed on the bilaterally shoulder:  Ambrocio-Winton test: negative. Neer test: negative. Stability tests performed on the bilaterally shoulder:  Sulcus sign: negative. Apprehension test: negative. AC Compression/sheer test: negative. Body Structures Involved:  1. Bones  2. Muscles  3. Ligaments Body Functions Affected:  1. Sensory/Pain  2. Neuromusculoskeletal  3. Movement Related Activities and Participation Affected:  1. General Tasks and Demands  2. Mobility  3. Domestic Life   Number of elements (examined above) that affect the Plan of Care: 4+: HIGH COMPLEXITY   CLINICAL PRESENTATION:   Presentation: Stable and uncomplicated: LOW COMPLEXITY   CLINICAL DECISION MAKING:   Outcome Measure: Tool Used: Disabilities of the Arm, Shoulder and Hand (DASH) Questionnaire - Quick Version  Score:  Initial: 26/55  Most Recent: X/55 (Date: -- )   Interpretation of Score:  The DASH is designed to measure the activities of daily living in person's with upper extremity dysfunction or pain. Each section is scored on a 1-5 scale, 5 representing the greatest disability. The scores of each section are added together for a total score of 55. Score 11 12-19 20-28 29-37 38-45 46-54 55   Modifier CH CI CJ CK CL CM CN     ? Carrying, Moving, and Handling Objects:     - CURRENT STATUS: CJ - 20%-39% impaired, limited or restricted    - GOAL STATUS: CI - 1%-19% impaired, limited or restricted    - D/C STATUS:  ---------------To be determined---------------    Medical Necessity:   · Patient demonstrates good rehab potential due to higher previous functional level. Reason for Services/Other Comments:  · Patient will benefit from therapy at this time as she has had previous results in the past.   Use of outcome tool(s) and clinical judgement create a POC that gives a: Clear prediction of patient's progress: LOW COMPLEXITY            TREATMENT:   (In addition to Assessment/Re-Assessment sessions the following treatments were rendered)  Pre-treatment Symptoms/Complaints:  Pt stated that she continues to have tightness in the posterior aspect of her right shoulder. Pain: Initial:   Pain Intensity 1: 2  Pain Location 1: Shoulder  Pain Orientation 1: Right  Post Session:    2/10 right shoulder     Therapeutic Exercise: ( 20 minutes):  Exercises per  below to improve mobility and strength. Required minimal verbal cues to promote proper body posture. Progressed complexity of movement as indicated. UBE Level 1 3' Forward/Backward  Wand flexion in sitting 2x10  Biceps/tricpes 2# 2x10  Posterior capsule stretch 3x30''    Manual Therapy (   30 minutes  ): Manual techniques to facilitate improved motion and decreased pain.  (Used abbreviations: MET - muscle energy technique; PNF - proprioceptive neuromuscular facilitation; NMR - neuromuscular re-education; a/p - anterior to posterior; p/a - posterior to anterior)   · Seated PROM to right shoulder in all available planes of motion  · STM to right and left anterior and posterior deltoid  · Cervical spine PROM in supine and seated position  · Stretching to levator scapulae and upper trapezius    Ultrasound to left shoulder x 10 minutes with pt seated in a chair, 1.8 w/cm2, skin clear upon completion. Kinesio taping to right shoulder as trial to decrease pressure in middle deltoid  Treatment/Session Assessment:    · Response to Treatment: Pt with decreased tenderness following manual therapy to right shoulder. · Compliance with Program/Exercises: Will assess as treatment progresses. · Recommendations/Intent for next treatment session: \"Next visit will focus on advancements to more challenging activities\".   · Total Treatment Duration: 60 minutes  PT Patient Time In/Time Out  Time In: 1500  Time Out: Dorothy Nazario [address]. Nile Beckwith

## 2018-04-20 ENCOUNTER — HOSPITAL ENCOUNTER (OUTPATIENT)
Dept: PHYSICAL THERAPY | Age: 73
Discharge: HOME | End: 2018-04-20
Payer: MEDICARE

## 2018-04-20 PROCEDURE — 97035 APP MDLTY 1+ULTRASOUND EA 15: CPT

## 2018-04-20 PROCEDURE — 97140 MANUAL THERAPY 1/> REGIONS: CPT

## 2018-04-20 PROCEDURE — 97110 THERAPEUTIC EXERCISES: CPT

## 2018-04-20 NOTE — PROGRESS NOTES
Chucky Bergman  : 1945  [place]  [place] Of [facility] at [address] [place] [address]  [phone]   [phone]       OUTPATIENT PHYSICAL THERAPY:Daily Note 2018    ICD-10: Treatment Diagnosis:  Pain in joint, shoulder,Right M25.511, Other Arthritis, monoarthritis, not elsewhere classified, shoulder, Right M13.111, Pain in limb, unspecified, arm, Right M79.601  Precautions/Allergies:   Review of patient's allergies indicates no known allergies. Fall Risk Score: 0 (? 5 = High Risk)  MD Orders: Evaluate and treat; Chronic shoulder bursitis,right, AC joint arthropahty MEDICAL/REFERRING DIAGNOSIS:  Bursitis of right shoulder [M75.51]  Primary osteoarthritis, unspecified shoulder [M19.019]   DATE OF ONSET: Chronic  REFERRING PHYSICIAN: Saida Boston MD  RETURN PHYSICIAN APPOINTMENT:  Nothing scheduled     INITIAL ASSESSMENT:  Ms. Santos Fritz presents with decreased right shoulder A/PROM strength and ROM limiting daily functional mobility and tolerance to activity due to increased pain. Pt has had therapy in the past which has helped pain levels. PROBLEM LIST (Impacting functional limitations):  1. Decreased Strength  2. Decreased ADL/Functional Activities  3. Increased Pain  4. Decreased Activity Tolerance  5. Decreased Flexibility/Joint Mobility INTERVENTIONS PLANNED:  1. Cold  2. Home Exercise Program (HEP)  3. Manual Therapy  4. Neuromuscular Re-education/Strengthening  5. Therapeutic Exercise/Strengthening   TREATMENT PLAN:  Effective Dates: 2018 TO 2018 (60 days). Frequency/Duration: 2 times a week for 60 Days  GOALS: (Goals have been discussed and agreed upon with patient.)  Short-Term Functional Goals: Time Frame: 18 30 days  1. Pt to demonstrate right shoulder flexion AROM to 170 degrees to allow for symmetrical movement.   2. Pt to demonstrate right shoulder abduction AROM to 170 degrees indicating improved joint mobility. 3. Pt to report decreased disability as per DASH with a score of 12-19. Discharge Goals: Time Frame: 60 days, 6/5/18  1. Pt to return to all household chores without increased pain/difficulty. 2. Pt to demonstrate right shoulder AROM measurements to equal left. 3. Pt to achieve independence with HEP. Rehabilitation Potential For Stated Goals: Good  Regarding Elmira Multani's therapy, I certify that the treatment plan above will be carried out by a therapist or under their direction. Thank you for this referral,  Rachel Brower, PT                 The information in this section was collected on 4/6/18 (except where otherwise noted). HISTORY:   History of Present Injury/Illness (Reason for Referral): Pt was in therapy for the right and left shoulder back in 2017, with last appointment on 3/24/17 for R and L supraspinatus tendinopathy and muscle guarding/spasms. Pt stated that the therapy did help her regain most of her function however her pain never subsided completely and now returns with inability to do any heavy lifting or housework. Pt received 2 injections 2 weeks ago which she is not able to identify. Pt stated that the injections have not decreased her pain. Past Medical History/Comorbidities:   Ms. Tiana Kat  has a past medical history of Bronchitis; Cyst in hand (11/26/2013); DJD (degenerative joint disease); History of rectal bleeding (11/26/2013); HTN (hypertension) (11/26/2013); Hyperlipidemia (11/26/2013); Rheumatoid arthritis(714.0) (11/26/2013); URI (upper respiratory infection); and Vitamin D deficiency (11/26/2013). Ms. Tiana Kat  has a past surgical history that includes hx knee replacement (Bilateral). Social History/Living Environment: Pt and her  live with their daughter and her family. 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      Dominant Side: RIGHT  Current Medications:     Alendronate,amlodipine,calcium-vitamin D,methotrexate,metoprolol,multivitamin oral,super B50 complex oral, synthroid,valsartan      Date Last Reviewed:  4/20/2018   Number of Personal Factors/Comorbidities that affect the Plan of Care: 0: LOW COMPLEXITY   EXAMINATION:   Observation/Orthostatic Postural Assessment:  Moderate forward shoulders     ROM: Gross active cervical spine rotation is 75% available. AROM(PROM) in degrees Right Left   Shoulder flexion 130 120   Shoulder abduction (palm down) 130 170   Shoulder internal rotation (IR)  (measured at 90 degrees of abduction) 40 40   Shoulder external rotation (ER)  (measured in scapular plane) 50 45     Strength:   Manual Muscle Test (*/5) Right Left   Shoulder flexion 4- Pain 4 Pain   Shoulder extension 4 4   Shoulder abduction 4- Pain 4 Pain   Shoulder adduction 4 4   Shoulder ER 4 4   Shoulder IR 4 4   Elbow flexion 4 4   Elbow extension 4 4   Special Tests:    Impingement tests performed on the bilaterally shoulder:  Ambrocio-Esmond test: negative. Neer test: negative. Stability tests performed on the bilaterally shoulder:  Sulcus sign: negative. Apprehension test: negative. AC Compression/sheer test: negative. Body Structures Involved:  1. Bones  2. Muscles  3. Ligaments Body Functions Affected:  1. Sensory/Pain  2. Neuromusculoskeletal  3. Movement Related Activities and Participation Affected:  1. General Tasks and Demands  2. Mobility  3. Domestic Life   Number of elements (examined above) that affect the Plan of Care: 4+: HIGH COMPLEXITY   CLINICAL PRESENTATION:   Presentation: Stable and uncomplicated: LOW COMPLEXITY   CLINICAL DECISION MAKING:   Outcome Measure: Tool Used: Disabilities of the Arm, Shoulder and Hand (DASH) Questionnaire - Quick Version  Score:  Initial: 26/55  Most Recent: X/55 (Date: -- )   Interpretation of Score:  The DASH is designed to measure the activities of daily living in person's with upper extremity dysfunction or pain. Each section is scored on a 1-5 scale, 5 representing the greatest disability. The scores of each section are added together for a total score of 55. Score 11 12-19 20-28 29-37 38-45 46-54 55   Modifier CH CI CJ CK CL CM CN     ? Carrying, Moving, and Handling Objects:     - CURRENT STATUS: CJ - 20%-39% impaired, limited or restricted    - GOAL STATUS: CI - 1%-19% impaired, limited or restricted    - D/C STATUS:  ---------------To be determined---------------    Medical Necessity:   · Patient demonstrates good rehab potential due to higher previous functional level. Reason for Services/Other Comments:  · Patient will benefit from therapy at this time as she has had previous results in the past.   Use of outcome tool(s) and clinical judgement create a POC that gives a: Clear prediction of patient's progress: LOW COMPLEXITY            TREATMENT:   (In addition to Assessment/Re-Assessment sessions the following treatments were rendered)  Pre-treatment Symptoms/Complaints: \"The taping helped my shoulder\"  Pain: Initial:   Pain Intensity 1: 2  Pain Location 1: Shoulder  Pain Orientation 1: Right  Post Session:    2/10 right shoulder     Therapeutic Exercise: ( 20 minutes):  Exercises per  below to improve mobility and strength. Required minimal verbal cues to promote proper body posture. Progressed complexity of movement as indicated. UBE Level 1 3' Forward/Backward  Wand flexion in sitting 2x10  Biceps/triceps 2# 2x10  Posterior capsule stretch 3x30''    Manual Therapy (30 minutes  ): Manual techniques to facilitate improved motion and decreased pain.  (Used abbreviations: MET - muscle energy technique; PNF - proprioceptive neuromuscular facilitation; NMR - neuromuscular re-education; a/p - anterior to posterior; p/a - posterior to anterior)   · Seated PROM to right shoulder in all available planes of motion  · STM to right and left anterior and posterior deltoid  · Cervical spine PROM in supine and seated position  · Stretching to levator scapulae and upper trapezius    Ultrasound to left shoulder x 10 minutes with pt seated in a chair, 1.8 w/cm2, skin clear upon completion. Kinesio taping to right shoulder as trial to decrease pressure in middle deltoid    Treatment/Session Assessment:    · Response to Treatment:  Pt tolerated session well, improvement in right shoulder AROM noted. · Compliance with Program/Exercises: Will assess as treatment progresses. · Recommendations/Intent for next treatment session: \"Next visit will focus on advancements to more challenging activities\".   · Total Treatment Duration: 60 minutes  PT Patient Time In/Time Out  Time In: 1300  Time Out: Compa Valdes

## 2018-04-23 ENCOUNTER — HOSPITAL ENCOUNTER (OUTPATIENT)
Dept: PHYSICAL THERAPY | Age: 73
Discharge: HOME | End: 2018-04-23
Payer: MEDICARE

## 2018-04-23 PROCEDURE — 97140 MANUAL THERAPY 1/> REGIONS: CPT

## 2018-04-23 PROCEDURE — 97035 APP MDLTY 1+ULTRASOUND EA 15: CPT

## 2018-04-23 PROCEDURE — 97110 THERAPEUTIC EXERCISES: CPT

## 2018-04-23 NOTE — PROGRESS NOTES
Nicole Cosme  : 1945  [place]  [place]: [place] [facility] at [address] [place] [address]  [phone]   [phone]       OUTPATIENT PHYSICAL THERAPY:Daily Note 2018    ICD-10: Treatment Diagnosis:  Pain in joint, shoulder,Right M25.511, Other Arthritis, monoarthritis, not elsewhere classified, shoulder, Right M13.111, Pain in limb, unspecified, arm, Right M79.601  Precautions/Allergies:   Review of patient's allergies indicates no known allergies. Fall Risk Score: 0 (? 5 = High Risk)  MD Orders: Evaluate and treat; Chronic shoulder bursitis,right, AC joint arthropahty MEDICAL/REFERRING DIAGNOSIS:  Bursitis of right shoulder [M75.51]  Primary osteoarthritis, unspecified shoulder [M19.019]   DATE OF ONSET: Chronic  REFERRING PHYSICIAN: Sampson Corrales MD  RETURN PHYSICIAN APPOINTMENT:  Nothing scheduled     INITIAL ASSESSMENT:  Ms. Ángel Mock presents with decreased right shoulder A/PROM strength and ROM limiting daily functional mobility and tolerance to activity due to increased pain. Pt has had therapy in the past which has helped pain levels. PROBLEM LIST (Impacting functional limitations):  1. Decreased Strength  2. Decreased ADL/Functional Activities  3. Increased Pain  4. Decreased Activity Tolerance  5. Decreased Flexibility/Joint Mobility INTERVENTIONS PLANNED:  1. Cold  2. Home Exercise Program (HEP)  3. Manual Therapy  4. Neuromuscular Re-education/Strengthening  5. Therapeutic Exercise/Strengthening   TREATMENT PLAN:  Effective Dates: 2018 TO 2018 (60 days). Frequency/Duration: 2 times a week for 60 Days  GOALS: (Goals have been discussed and agreed upon with patient.)  Short-Term Functional Goals: Time Frame: 18 30 days  1. Pt to demonstrate right shoulder flexion AROM to 170 degrees to allow for symmetrical movement.   2. Pt to demonstrate right shoulder abduction AROM to 170 degrees indicating improved joint mobility. 3. Pt to report decreased disability as per DASH with a score of 12-19. Discharge Goals: Time Frame: 60 days, 6/5/18  1. Pt to return to all household chores without increased pain/difficulty. 2. Pt to demonstrate right shoulder AROM measurements to equal left. 3. Pt to achieve independence with HEP. Rehabilitation Potential For Stated Goals: Good  Regarding Zekeradhazuhair Multani's therapy, I certify that the treatment plan above will be carried out by a therapist or under their direction. Thank you for this referral,  Dylan Kat, PT                 The information in this section was collected on 4/6/18 (except where otherwise noted). HISTORY:   History of Present Injury/Illness (Reason for Referral): Pt was in therapy for the right and left shoulder back in 2017, with last appointment on 3/24/17 for R and L supraspinatus tendinopathy and muscle guarding/spasms. Pt stated that the therapy did help her regain most of her function however her pain never subsided completely and now returns with inability to do any heavy lifting or housework. Pt received 2 injections 2 weeks ago which she is not able to identify. Pt stated that the injections have not decreased her pain. Past Medical History/Comorbidities:   Ms. Darnell Goodson  has a past medical history of Bronchitis; Cyst in hand (11/26/2013); DJD (degenerative joint disease); History of rectal bleeding (11/26/2013); HTN (hypertension) (11/26/2013); Hyperlipidemia (11/26/2013); Rheumatoid arthritis(714.0) (11/26/2013); URI (upper respiratory infection); and Vitamin D deficiency (11/26/2013). Ms. Darnell Goodson  has a past surgical history that includes hx knee replacement (Bilateral). Social History/Living Environment: Pt and her  live with their daughter and her family. 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      Dominant Side: RIGHT  Current Medications:     Alendronate,amlodipine,calcium-vitamin D,methotrexate,metoprolol,multivitamin oral,super B50 complex oral, synthroid,valsartan      Date Last Reviewed:  4/23/2018   Number of Personal Factors/Comorbidities that affect the Plan of Care: 0: LOW COMPLEXITY   EXAMINATION:   Observation/Orthostatic Postural Assessment:  Moderate forward shoulders     ROM: Gross active cervical spine rotation is 75% available. AROM(PROM) in degrees Right Left   Shoulder flexion 130 120   Shoulder abduction (palm down) 130 170   Shoulder internal rotation (IR)  (measured at 90 degrees of abduction) 40 40   Shoulder external rotation (ER)  (measured in scapular plane) 50 45     Strength:   Manual Muscle Test (*/5) Right Left   Shoulder flexion 4- Pain 4 Pain   Shoulder extension 4 4   Shoulder abduction 4- Pain 4 Pain   Shoulder adduction 4 4   Shoulder ER 4 4   Shoulder IR 4 4   Elbow flexion 4 4   Elbow extension 4 4   Special Tests:    Impingement tests performed on the bilaterally shoulder:  Ambrocio-[place] test: negative. Neer test: negative. Stability tests performed on the bilaterally shoulder:  Sulcus sign: negative. Apprehension test: negative. AC Compression/sheer test: negative. Body Structures Involved:  1. Bones  2. Muscles  3. Ligaments Body Functions Affected:  1. Sensory/Pain  2. Neuromusculoskeletal  3. Movement Related Activities and Participation Affected:  1. General Tasks and Demands  2. Mobility  3. Domestic Life   Number of elements (examined above) that affect the Plan of Care: 4+: HIGH COMPLEXITY   CLINICAL PRESENTATION:   Presentation: Stable and uncomplicated: LOW COMPLEXITY   CLINICAL DECISION MAKING:   Outcome Measure: Tool Used: Disabilities of the Arm, Shoulder and Hand (DASH) Questionnaire - Quick Version  Score:  Initial: 26/55  Most Recent: X/55 (Date: -- )   Interpretation of Score:  The DASH is designed to measure the activities of daily living in person's with upper extremity dysfunction or pain. Each section is scored on a 1-5 scale, 5 representing the greatest disability. The scores of each section are added together for a total score of 55. Score 11 12-19 20-28 29-37 38-45 46-54 55   Modifier CH CI CJ CK CL CM CN     ? Carrying, Moving, and Handling Objects:     - CURRENT STATUS: CJ - 20%-39% impaired, limited or restricted    - GOAL STATUS: CI - 1%-19% impaired, limited or restricted    - D/C STATUS:  ---------------To be determined---------------    Medical Necessity:   · Patient demonstrates good rehab potential due to higher previous functional level. Reason for Services/Other Comments:  · Patient will benefit from therapy at this time as she has had previous results in the past.   Use of outcome tool(s) and clinical judgement create a POC that gives a: Clear prediction of patient's progress: LOW COMPLEXITY            TREATMENT:   (In addition to Assessment/Re-Assessment sessions the following treatments were rendered)  Pre-treatment Symptoms/Complaints:  \"Some days my shoulder hurts bad and sometimes no pain\"  Pain: Initial:   Pain Intensity 1: 2  Pain Location 1: Shoulder  Pain Orientation 1: Right  Post Session:    2/10 right shoulder     Therapeutic Exercise: ( 30 minutes):  Exercises per  below to improve mobility and strength. Required minimal verbal cues to promote proper body posture. Progressed complexity of movement as indicated. UBE Level 1 3' Forward/Backward  UE ROM: Flexion, abduction x 10  T-band green extension, rows,IR/ER, adduction x10  Posterior capsule stretch 3x30''    Manual Therapy (20 minutes  ): Manual techniques to facilitate improved motion and decreased pain.  (Used abbreviations: MET - muscle energy technique; PNF - proprioceptive neuromuscular facilitation; NMR - neuromuscular re-education; a/p - anterior to posterior; p/a - posterior to anterior)   · Seated PROM to right shoulder in all available planes of motion  · STM to right and left anterior and posterior deltoid  · Stretching to levator scapulae and upper trapezius    Ultrasound to left shoulder x 10 minutes with pt seated in a chair, 1.8 w/cm2, skin clear upon completion. Treatment/Session Assessment:    · Response to Treatment:  Pt tolerated session well, improvement in shoulder flexion noted, pt had some difficulty with right shoulder extension with tband. · Compliance with Program/Exercises: Will assess as treatment progresses. · Recommendations/Intent for next treatment session: \"Next visit will focus on advancements to more challenging activities\".   · Total Treatment Duration: 60 minutes  PT Patient Time In/Time Out  Time In: 1400  Time Out: 1500    Sanaz Glasgow PT

## 2018-04-25 ENCOUNTER — APPOINTMENT (OUTPATIENT)
Dept: PHYSICAL THERAPY | Age: 73
End: 2018-04-25
Payer: MEDICARE

## 2018-04-25 ENCOUNTER — HOSPITAL ENCOUNTER (OUTPATIENT)
Dept: PHYSICAL THERAPY | Age: 73
Discharge: HOME | End: 2018-04-25
Payer: MEDICARE

## 2018-04-25 PROCEDURE — G8985 CARRY GOAL STATUS: HCPCS

## 2018-04-25 PROCEDURE — G8984 CARRY CURRENT STATUS: HCPCS

## 2018-04-25 PROCEDURE — 97140 MANUAL THERAPY 1/> REGIONS: CPT

## 2018-04-25 PROCEDURE — 97110 THERAPEUTIC EXERCISES: CPT

## 2018-04-25 NOTE — PROGRESS NOTES
Neto Maldonado  : 1945  [place]  [place]: [place] [facility] at [address] [place] [address]  [phone]   [phone]       OUTPATIENT PHYSICAL THERAPY:Daily Note 2018    ICD-10: Treatment Diagnosis:  Pain in joint, shoulder,Right M25.511, Other Arthritis, monoarthritis, not elsewhere classified, shoulder, Right M13.111, Pain in limb, unspecified, arm, Right M79.601  Precautions/Allergies:   Review of patient's allergies indicates no known allergies. Fall Risk Score: 0 (? 5 = High Risk)  MD Orders: Evaluate and treat; Chronic shoulder bursitis,right, AC joint arthropahty MEDICAL/REFERRING DIAGNOSIS:  Bursitis of right shoulder [M75.51]  Primary osteoarthritis, unspecified shoulder [M19.019]   DATE OF ONSET: Chronic  REFERRING PHYSICIAN: Lowell Skelton MD  RETURN PHYSICIAN APPOINTMENT:  Nothing scheduled     INITIAL ASSESSMENT:  Ms. Chavo Kenney presents with decreased right shoulder A/PROM strength and ROM limiting daily functional mobility and tolerance to activity due to increased pain. Pt has had therapy in the past which has helped pain levels. PROBLEM LIST (Impacting functional limitations):  1. Decreased Strength  2. Decreased ADL/Functional Activities  3. Increased Pain  4. Decreased Activity Tolerance  5. Decreased Flexibility/Joint Mobility INTERVENTIONS PLANNED:  1. Cold  2. Home Exercise Program (HEP)  3. Manual Therapy  4. Neuromuscular Re-education/Strengthening  5. Therapeutic Exercise/Strengthening   TREATMENT PLAN:  Effective Dates: 2018 TO 2018 (60 days). Frequency/Duration: 2 times a week for 60 Days  GOALS: (Goals have been discussed and agreed upon with patient.)  Short-Term Functional Goals: Time Frame: 18 30 days  1. Pt to demonstrate right shoulder flexion AROM to 170 degrees to allow for symmetrical movement.   2. Pt to demonstrate right shoulder abduction AROM to 170 degrees indicating improved joint mobility. 3. Pt to report decreased disability as per DASH with a score of 12-19. Discharge Goals: Time Frame: 60 days, 6/5/18  1. Pt to return to all household chores without increased pain/difficulty. 2. Pt to demonstrate right shoulder AROM measurements to equal left. 3. Pt to achieve independence with HEP. Rehabilitation Potential For Stated Goals: Good  Regarding Elmira SLAUGHTER Multani's therapy, I certify that the treatment plan above will be carried out by a therapist or under their direction. Thank you for this referral,  Margaux Lopez PT                 The information in this section was collected on 4/6/18 (except where otherwise noted). HISTORY:   History of Present Injury/Illness (Reason for Referral): Pt was in therapy for the right and left shoulder back in 2017, with last appointment on 3/24/17 for R and L supraspinatus tendinopathy and muscle guarding/spasms. Pt stated that the therapy did help her regain most of her function however her pain never subsided completely and now returns with inability to do any heavy lifting or housework. Pt received 2 injections 2 weeks ago which she is not able to identify. Pt stated that the injections have not decreased her pain. Past Medical History/Comorbidities:   Ms. Beena Bills  has a past medical history of Bronchitis; Cyst in hand (11/26/2013); DJD (degenerative joint disease); History of rectal bleeding (11/26/2013); HTN (hypertension) (11/26/2013); Hyperlipidemia (11/26/2013); Rheumatoid arthritis(714.0) (11/26/2013); URI (upper respiratory infection); and Vitamin D deficiency (11/26/2013). Ms. Beena Bills  has a past surgical history that includes hx knee replacement (Bilateral). Social History/Living Environment: Pt and her  live with their daughter and her family. 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      Dominant Side: RIGHT  Current Medications:     Alendronate,amlodipine,calcium-vitamin D,methotrexate,metoprolol,multivitamin oral,super B50 complex oral, synthroid,valsartan      Date Last Reviewed:  4/25/2018   Number of Personal Factors/Comorbidities that affect the Plan of Care: 0: LOW COMPLEXITY   EXAMINATION:   Observation/Orthostatic Postural Assessment:  Moderate forward shoulders     ROM: Gross active cervical spine rotation is 75% available. AROM(PROM) in degrees Right Left   Shoulder flexion 130 120   Shoulder abduction (palm down) 130 170   Shoulder internal rotation (IR)  (measured at 90 degrees of abduction) 40 40   Shoulder external rotation (ER)  (measured in scapular plane) 50 45     Strength:   Manual Muscle Test (*/5) Right Left   Shoulder flexion 4- Pain 4 Pain   Shoulder extension 4 4   Shoulder abduction 4- Pain 4 Pain   Shoulder adduction 4 4   Shoulder ER 4 4   Shoulder IR 4 4   Elbow flexion 4 4   Elbow extension 4 4   Special Tests:    Impingement tests performed on the bilaterally shoulder:  Ambrocio-[place] test: Negative. Neer test: Negative. Stability tests performed on the bilaterally shoulder:  Sulcus sign: negative. Apprehension test: negative. AC Compression/sheer test: negative. Body Structures Involved:  1. Bones  2. Muscles  3. Ligaments Body Functions Affected:  1. Sensory/Pain  2. Neuromusculoskeletal  3. Movement Related Activities and Participation Affected:  1. General Tasks and Demands  2. Mobility  3. Domestic Life   Number of elements (examined above) that affect the Plan of Care: 4+: HIGH COMPLEXITY   CLINICAL PRESENTATION:   Presentation: Stable and uncomplicated: LOW COMPLEXITY   CLINICAL DECISION MAKING:   Outcome Measure: Tool Used: Disabilities of the Arm, Shoulder and Hand (DASH) Questionnaire - Quick Version  Score:  Initial: 26/55  Most Recent: 26/55 (Date: 4/25/18)   Interpretation of Score:  The DASH is designed to measure the activities of daily living in person's with upper extremity dysfunction or pain. Each section is scored on a 1-5 scale, 5 representing the greatest disability. The scores of each section are added together for a total score of 55. Score 11 12-19 20-28 29-37 38-45 46-54 55   Modifier CH CI CJ CK CL CM CN     ? Carrying, Moving, and Handling Objects:     - CURRENT STATUS: CJ - 20%-39% impaired, limited or restricted    - GOAL STATUS: CI - 1%-19% impaired, limited or restricted    - D/C STATUS:  ---------------To be determined---------------    Medical Necessity:   · Patient demonstrates good rehab potential due to higher previous functional level. Reason for Services/Other Comments:  · Patient will benefit from therapy at this time as she has had previous results in the past.   Use of outcome tool(s) and clinical judgement create a POC that gives a: Clear prediction of patient's progress: LOW COMPLEXITY            TREATMENT:   (In addition to Assessment/Re-Assessment sessions the following treatments were rendered)  Pre-treatment Symptoms/Complaints:  Pt has not tried to make dinner yet , which is what caused her the most amount of pain. Pain: Initial:   Pain Intensity 1: 2  Pain Location 1: Shoulder  Pain Orientation 1: Right  Post Session:    2/10 right shoulder     Therapeutic Exercise: ( 30 minutes):  Exercises per  below to improve mobility and strength. Required minimal verbal cues to promote proper body posture. Progressed complexity of movement as indicated. UBE Level 3 3' Forward/Backward  UE ROM: Flexion, abduction x 10  Wand flexion seated x10  T-band green extension, rows,IR/ER, adduction x10  Posterior capsule stretch 3x30''    Manual Therapy (30 minutes  ): Manual techniques to facilitate improved motion and decreased pain.  (Used abbreviations: MET - muscle energy technique; PNF - proprioceptive neuromuscular facilitation; NMR - neuromuscular re-education; a/p - anterior to posterior; p/a - posterior to anterior)   · Seated PROM to right shoulder in all available planes of motion  · STM to right and left anterior and posterior deltoid  · Stretching to levator scapulae and upper trapezius    Treatment/Session Assessment:    · Response to Treatment:  Pt attempted a \"mock\" rolling a pin to make bread in which pain was not reproduced. · Compliance with Program/Exercises: Will assess as treatment progresses. · Recommendations/Intent for next treatment session: \"Next visit will focus on advancements to more challenging activities\".   · Total Treatment Duration: 60 minutes  PT Patient Time In/Time Out  Time In: 1300  Time Out: [place] Binnie Pal

## 2018-04-27 ENCOUNTER — HOSPITAL ENCOUNTER (OUTPATIENT)
Dept: PHYSICAL THERAPY | Age: 73
Discharge: HOME | End: 2018-04-27
Payer: MEDICARE

## 2018-04-27 NOTE — PROGRESS NOTES
Sancho Young  : 1945  [place]  [place] [facility] at [address] [place] [address]  [phone]   [phone]      OUTPATIENT DAILY NOTE    NAME/AGE/GENDER: Sancho Young is a 67 y.o. female. DATE: 2018    Patient canceled for appointment today due to not feeling well. Will plan to follow up on next scheduled visit.     May Bacon, PT

## 2018-04-30 ENCOUNTER — HOSPITAL ENCOUNTER (OUTPATIENT)
Dept: PHYSICAL THERAPY | Age: 73
Discharge: HOME | End: 2018-04-30
Payer: MEDICARE

## 2018-04-30 NOTE — PROGRESS NOTES
Monica Page  : 1945  [place]  [place] Of [facility] at [address]  [phone]   [phone]      OUTPATIENT DAILY NOTE    NAME/AGE/GENDER: Monica Page is a 67 y.o. female. DATE: 2018    Patient canceled for appointment today due to being sick still. Will plan to follow up on next scheduled visit.     Silvia Harrell, PT

## 2018-05-02 ENCOUNTER — APPOINTMENT (OUTPATIENT)
Dept: PHYSICAL THERAPY | Age: 73
End: 2018-05-02

## 2018-05-04 ENCOUNTER — APPOINTMENT (OUTPATIENT)
Dept: PHYSICAL THERAPY | Age: 73
End: 2018-05-04

## 2018-05-07 ENCOUNTER — APPOINTMENT (OUTPATIENT)
Dept: PHYSICAL THERAPY | Age: 73
End: 2018-05-07

## 2018-05-09 ENCOUNTER — HOSPITAL ENCOUNTER (OUTPATIENT)
Dept: PHYSICAL THERAPY | Age: 73
Discharge: HOME | End: 2018-05-09

## 2018-05-09 NOTE — PROGRESS NOTES
Familia Gale  : 1945  [place]  [place] [facility] at [address]  [phone]   [phone]      OUTPATIENT DAILY NOTE    NAME/AGE/GENDER: Familia Gale is a 67 y.o. female. DATE: 2018    Patient canceled for appointment today due to being sick. Attempted to call patient to confirm future appointments as she has had multiple cancellations however unsuccessful. Will plan to follow up on next scheduled visit.     Sanaz Glasgow, PT

## 2018-05-16 ENCOUNTER — APPOINTMENT (OUTPATIENT)
Dept: PHYSICAL THERAPY | Age: 73
End: 2018-05-16

## 2018-05-21 ENCOUNTER — APPOINTMENT (OUTPATIENT)
Dept: PHYSICAL THERAPY | Age: 73
End: 2018-05-21

## 2018-05-23 ENCOUNTER — APPOINTMENT (OUTPATIENT)
Dept: PHYSICAL THERAPY | Age: 73
End: 2018-05-23

## 2019-02-07 ENCOUNTER — HOSPITAL ENCOUNTER (OUTPATIENT)
Dept: MAMMOGRAPHY | Age: 74
Discharge: HOME | End: 2019-02-07
Attending: INTERNAL MEDICINE
Payer: MEDICARE

## 2019-02-07 DIAGNOSIS — Z78.0 MENOPAUSE: ICD-10-CM

## 2019-02-07 PROCEDURE — 77080 DXA BONE DENSITY AXIAL: CPT

## 2021-12-07 ENCOUNTER — TRANSCRIBE ORDER (OUTPATIENT)
Dept: SCHEDULING | Age: 76
End: 2021-12-07

## 2021-12-07 DIAGNOSIS — M81.0 AGE-RELATED OSTEOPOROSIS WITHOUT CURRENT PATHOLOGICAL FRACTURE: Primary | ICD-10-CM

## 2022-01-20 ENCOUNTER — TRANSCRIBE ORDER (OUTPATIENT)
Dept: SCHEDULING | Age: 77
End: 2022-01-20

## 2022-01-20 DIAGNOSIS — M81.0 AGE-RELATED OSTEOPOROSIS WITHOUT CURRENT PATHOLOGICAL FRACTURE: Primary | ICD-10-CM

## 2022-03-19 PROBLEM — Z98.890 HISTORY OF PARATHYROID SURGERY: Status: ACTIVE | Noted: 2017-02-22
